# Patient Record
Sex: MALE | Race: WHITE | Employment: UNEMPLOYED | URBAN - METROPOLITAN AREA
[De-identification: names, ages, dates, MRNs, and addresses within clinical notes are randomized per-mention and may not be internally consistent; named-entity substitution may affect disease eponyms.]

---

## 2018-03-21 ENCOUNTER — TELEPHONE (OUTPATIENT)
Dept: FAMILY MEDICINE | Facility: CLINIC | Age: 53
End: 2018-03-21

## 2018-06-10 ENCOUNTER — NURSE TRIAGE (OUTPATIENT)
Dept: NURSING | Facility: CLINIC | Age: 53
End: 2018-06-10

## 2018-06-10 NOTE — TELEPHONE ENCOUNTER
"  Reason for Disposition    [1] SEVERE headache AND [2]  not improved 2 hours after pain medicine/ice packs    Additional Information    Negative: [1] ACUTE NEURO SYMPTOM AND [2] present now  (DEFINITION: difficult to awaken OR confused thinking and talking OR slurred speech OR weakness of arms OR unsteady walking)    Negative: Knocked out (unconscious) > 1 minute    Negative: Seizure (convulsion) occurred  (Exception: prior history of seizures and now alert and without Acute Neuro Symptoms)    Negative: Penetrating head injury (e.g., knife, gun shot wound, metal object)    Negative: [1] Major bleeding (e.g., actively dripping or spurting) AND [2] can't be stopped    Negative: [1] Dangerous mechanism of injury (e.g., MVA, diving, trampoline, contact sports, fall > 10 feet or 3 meters) AND [2] NECK pain AND [3] began < 1 hour after injury    Negative: Sounds like a life-threatening emergency to the triager    Negative: Can't remember what happened (amnesia)    Negative: Vomiting once or more    Negative: [1] Loss of vision or double vision AND [2] present now    Negative: Watery or blood-tinged fluid dripping from the NOSE or EARS now  (Exception: tears from crying or nosebleed from nasal trauma)    Negative: One or two \"black eyes\" (bruising, purple color of eyelids)    Negative: [1] Large swelling AND [2] size > palm of person's hand    Negative: Skin is split open or gaping  (or length > 1/2 inch or 12 mm)    Negative: [1] Bleeding AND [2] won't stop after 10 minutes of direct pressure (using correct technique)    Negative: Sounds like a serious injury to the triager    Negative: [1] ACUTE NEURO SYMPTOM AND [2] now fine  (DEFINITION: difficult to awaken OR confused thinking and talking OR slurred speech OR weakness of arms OR unsteady walking)    Negative: [1] Knocked out (unconscious) < 1 minute AND [2] now fine    Protocols used: HEAD INJURY-ADULT-    "

## 2018-06-10 NOTE — TELEPHONE ENCOUNTER
Kamaljit had slipped and hit the back of his head on the car.  Today his back and neck are hurting and he is having pain that is also going from back to chest area.

## 2018-06-21 ENCOUNTER — HOSPITAL ENCOUNTER (EMERGENCY)
Facility: CLINIC | Age: 53
Discharge: HOME | End: 2018-06-21
Attending: EMERGENCY MEDICINE | Admitting: EMERGENCY MEDICINE
Payer: COMMERCIAL

## 2018-06-21 VITALS
SYSTOLIC BLOOD PRESSURE: 180 MMHG | TEMPERATURE: 98 F | OXYGEN SATURATION: 99 % | RESPIRATION RATE: 16 BRPM | HEART RATE: 90 BPM | HEIGHT: 75 IN | BODY MASS INDEX: 31.08 KG/M2 | DIASTOLIC BLOOD PRESSURE: 100 MMHG | WEIGHT: 250 LBS

## 2018-06-21 DIAGNOSIS — M54.2 CERVICALGIA: ICD-10-CM

## 2018-06-21 LAB
ANION GAP SERPL CALCULATED.3IONS-SCNC: 9 MMOL/L (ref 3–14)
BASOPHILS # BLD AUTO: 0.1 10E9/L (ref 0–0.2)
BASOPHILS NFR BLD AUTO: 0.7 %
BUN SERPL-MCNC: 16 MG/DL (ref 7–30)
CALCIUM SERPL-MCNC: 8.6 MG/DL (ref 8.5–10.1)
CHLORIDE SERPL-SCNC: 105 MMOL/L (ref 94–109)
CO2 SERPL-SCNC: 26 MMOL/L (ref 20–32)
CREAT SERPL-MCNC: 0.8 MG/DL (ref 0.66–1.25)
DIFFERENTIAL METHOD BLD: NORMAL
EOSINOPHIL NFR BLD AUTO: 0.9 %
ERYTHROCYTE [DISTWIDTH] IN BLOOD BY AUTOMATED COUNT: 12.7 % (ref 10–15)
GFR SERPL CREATININE-BSD FRML MDRD: >90 ML/MIN/1.7M2
GLUCOSE SERPL-MCNC: 189 MG/DL (ref 70–99)
HCT VFR BLD AUTO: 42.9 % (ref 40–53)
HGB BLD-MCNC: 15.5 G/DL (ref 13.3–17.7)
IMM GRANULOCYTES # BLD: 0 10E9/L (ref 0–0.4)
IMM GRANULOCYTES NFR BLD: 0.4 %
LYMPHOCYTES # BLD AUTO: 1.1 10E9/L (ref 0.8–5.3)
LYMPHOCYTES NFR BLD AUTO: 14.9 %
MCH RBC QN AUTO: 31.9 PG (ref 26.5–33)
MCHC RBC AUTO-ENTMCNC: 36.1 G/DL (ref 31.5–36.5)
MCV RBC AUTO: 88 FL (ref 78–100)
MONOCYTES # BLD AUTO: 0.4 10E9/L (ref 0–1.3)
MONOCYTES NFR BLD AUTO: 5.8 %
NEUTROPHILS # BLD AUTO: 5.8 10E9/L (ref 1.6–8.3)
NEUTROPHILS NFR BLD AUTO: 77.3 %
NRBC # BLD AUTO: 0 10*3/UL
NRBC BLD AUTO-RTO: 0 /100
PLATELET # BLD AUTO: 244 10E9/L (ref 150–450)
POTASSIUM SERPL-SCNC: 3.3 MMOL/L (ref 3.4–5.3)
RBC # BLD AUTO: 4.86 10E12/L (ref 4.4–5.9)
SODIUM SERPL-SCNC: 140 MMOL/L (ref 133–144)
T4 FREE SERPL-MCNC: 0.81 NG/DL (ref 0.76–1.46)
TROPONIN I SERPL-MCNC: <0.015 UG/L (ref 0–0.04)
TSH SERPL DL<=0.005 MIU/L-ACNC: 14.81 MU/L (ref 0.4–4)
WBC # BLD AUTO: 7.5 10E9/L (ref 4–11)

## 2018-06-21 PROCEDURE — 84439 ASSAY OF FREE THYROXINE: CPT | Performed by: EMERGENCY MEDICINE

## 2018-06-21 PROCEDURE — 93005 ELECTROCARDIOGRAM TRACING: CPT | Performed by: EMERGENCY MEDICINE

## 2018-06-21 PROCEDURE — 84484 ASSAY OF TROPONIN QUANT: CPT | Performed by: EMERGENCY MEDICINE

## 2018-06-21 PROCEDURE — 99284 EMERGENCY DEPT VISIT MOD MDM: CPT | Mod: 25 | Performed by: EMERGENCY MEDICINE

## 2018-06-21 PROCEDURE — 80048 BASIC METABOLIC PNL TOTAL CA: CPT | Performed by: EMERGENCY MEDICINE

## 2018-06-21 PROCEDURE — 93010 ELECTROCARDIOGRAM REPORT: CPT | Mod: Z6 | Performed by: EMERGENCY MEDICINE

## 2018-06-21 PROCEDURE — 99284 EMERGENCY DEPT VISIT MOD MDM: CPT | Performed by: EMERGENCY MEDICINE

## 2018-06-21 PROCEDURE — 84443 ASSAY THYROID STIM HORMONE: CPT | Performed by: EMERGENCY MEDICINE

## 2018-06-21 PROCEDURE — 85025 COMPLETE CBC W/AUTO DIFF WBC: CPT | Performed by: EMERGENCY MEDICINE

## 2018-06-21 RX ORDER — HYDROCODONE BITARTRATE AND ACETAMINOPHEN 5; 325 MG/1; MG/1
1 TABLET ORAL EVERY 4 HOURS PRN
Qty: 20 TABLET | Refills: 0 | Status: SHIPPED | OUTPATIENT
Start: 2018-06-21 | End: 2018-07-09

## 2018-06-21 NOTE — ED TRIAGE NOTES
Pain in neck radiates into shoulder blades and left arm and chest since he fell 2 weeks ago. Slipped on wet surface. Pain is getting worse. Has been taking motrin and meloxicam for pain. Has also had swelling back of neck and left shoulder

## 2018-06-21 NOTE — ED AVS SNAPSHOT
[facility]    [address] [place] [address]    Phone:  [phone]    Fax:  [phone]                                       Kamaljit Montes De Oca   MRN: [id number]    Department:  [facility] Emergency Department   Date of Visit:  6/21/2018           Patient Information     Date Of Birth          1965        Your diagnoses for this visit were:     Cervicalgia        You were seen by Zeke Tubbs MD.      Follow-up Information     Follow up with [facility], [facility].    Why:  ER follow up, As planned    Contact information:    [address] [place] [address]  [phone]          Discharge Instructions         General Neck and Back Pain    Both neck and back pain are usually caused by injury to the muscles or ligaments of the spine. Sometimes the disks that separate each bone of the spine may cause pain by pressing on a nearby nerve. Back and neck pain may appear after a sudden twisting or bending force (such as in a car accident), or sometimes after a simple awkward movement. In either case, muscle spasm is often present and adds to the pain.  Acute neck and back pain usually gets better in 1 to 2 weeks. Pain related to disk disease, arthritis in the spinal joints or spinal stenosis (narrowing of the spinal canal) can become chronic and last for months or years.  Back and neck pain are common problems. Most people feel better in 1 or 2 weeks, and most of the rest in 1 to 2 months. Most people can remain active.  People have and describe pain differently.    Pain can be sharp, stabbing, shooting, aching, cramping, or burning    Movement, standing, bending, lifting, sitting, or walking may worsen the pain    Pain can be localized to one spot or area, or it can be more generalized    Pain can spread or radiate upwards, downwards, to the front, or go down your arms    Muscle spasm may occur.  Most of the time mechanical problems with the  muscles or spine cause the pain. it is usually caused by an injury, whether known or not, to the muscles or ligaments. While illnesses can cause back pain, it is usually not caused by a serious illness. Pain is usually related to physical activity, whether sports, exercise, work, or normal activity. Sometimes it can occur without an identifiable cause. This can happen simply by stretching or moving wrong, without noting pain at the time. Other causes include:    Overexertion, lifting, pushing, pulling incorrectly or too aggressively.    Sudden twisting, bending or stretching from an accident (car or fall), or accidental movement.    Poor posture    Poor conditioning, lack of regular exercise    Spinal disc disease or arthritis    Stress    Pregnancy, or illness like appendicitis, bladder or kidney infection, pelvic infections   Home care    For neck pain: Use a comfortable pillow that supports the head and keeps the spine in a neutral position. The position of the head should not be tilted forward or backward.    When in bed, try to find a position of comfort. A firm mattress is best. Try lying flat on your back with pillows under your knees. You can also try lying on your side with your knees bent up towards your chest and a pillow between your knees.    At first, do not try to stretch out the sore spots. If there is a strain, it is not like the good soreness you get after exercising without an injury. In this case, stretching may make it worse.    Don't sit for long periods, as in long car rides or other travel. This puts more stress on the lower back than standing or walking.    During the first 24 to 72 hours after an injury, apply an ice pack to the painful area for 20 minutes and then remove it for 20 minutes over a period of 60 to 90 minutes or several times a day.     You can alternate ice and heat therapies. Talk with your healthcare provider about the best treatment for your back or neck pain. As a safety  precaution, do not use a heating pad at bedtime. Sleeping with a heating pad can lead to skin burns or tissue damage.    Therapeutic massage can help relax the back and neck muscles without stretching them.    Be aware of safe lifting methods and do not lift anything over 15 pounds until all the pain is gone.  Medicines  Talk to your healthcare provider before using medicine, especially if you have other medical problems or are taking other medicines.    You may use over-the-counter medicine to control pain, unless another pain medicine was prescribed. If you have chronic conditions like diabetes, liver or kidney disease, stomach ulcers,  gastrointestinal bleeding, or are taking blood thinner medicines.    Be careful if you are given pain medicines, narcotics, or medicine for muscle spasm. They can cause drowsiness, and can affect your coordination, reflexes, and judgment. Do not drive or operate heavy machinery.  Follow-up care  Follow up with your healthcare provider, or as advised. Physical therapy or further tests may be needed.  If X-rays were taken, you will be notified of any new findings that may affect your care.  Call 911  Call 911 if any of the following occur:    Trouble breathing    Confusion    Very drowsy or trouble awakening    Fainting or loss of consciousness    Rapid or very slow heart rate    Loss of bowel or bladder control  When to seek medical advice  Call your healthcare provider right away if any of these occur:    Pain becomes worse or spreads into your arms or legs    Weakness, numbness or pain in one or both arms or legs    Numbness in the groin area    Difficulty walking    Fever of 100.4 F (38 C) or higher, or as directed by your healthcare provider  Date Last Reviewed: 7/1/2016 2000-2017 [employer]. [address] [place] [address]. All rights reserved. This information is not intended as a substitute for professional medical care. Always follow your  healthcare professional's instructions.          Your next 10 appointments already scheduled     Jun 27, 2018 10:40 AM CDT   Office Visit with Sixto Ellison MD   [facility] ([facility])    [address] [place] [address] [phone]           Bring a current list of meds and any records pertaining to this visit. For Physicals, please bring immunization records and any forms needing to be filled out. Please arrive 10 minutes early to complete paperwork.              24 Hour Appointment Hotline       To make an appointment at any [facility], call [phone] ([phone]). If you don't have a family doctor or clinic, we will help you find one. [facility] are conveniently located to serve the needs of you and your family.             Review of your medicines      START taking        Dose / Directions Last dose taken    HYDROcodone-acetaminophen 5-325 MG per tablet   Commonly known as:  NORCO   Dose:  1 tablet   Quantity:  20 tablet        Take 1 tablet by mouth every 4 hours as needed for pain   Refills:  0          Our records show that you are taking the medicines listed below. If these are incorrect, please call your family doctor or clinic.        Dose / Directions Last dose taken    blood glucose monitoring lancets   Quantity:  100 each        Use to test blood sugars twice daily as directed.   Refills:  prn        blood glucose monitoring test strip   Commonly known as:  CORRINA CONTOUR NEXT   Quantity:  1 Box        by In Vitro route daily.   Refills:  12        enalapril 1 MG/ML solution   Commonly known as:  EPANED   Dose:  5 mg   Quantity:  150 mL        Take 5 mg by mouth daily   Refills:  1        fluconazole 100 MG tablet   Commonly known as:  DIFLUCAN   Quantity:  11 tablet        Take 2 tablets first day, then once daily.   Refills:  0        ibuprofen 100 MG/5ML suspension   Commonly known as:  ADVIL/MOTRIN   Quantity:  500 mL        May  take 10 to 20 ml every 6-8 hours prn pain   Refills:  6        mineral oil-hydrophilic petrolatum   Quantity:  420 g        Apply  topically as needed for dry skin.   Refills:  6        nicotine 21 MG/24HR 24 hr patch   Commonly known as:  NICODERM CQ   Dose:  1 patch   Quantity:  30 patch        Place 1 patch onto the skin every 24 hours.   Refills:  0        ondansetron 4 MG ODT tab   Commonly known as:  ZOFRAN ODT   Dose:  4 mg   Quantity:  20 tablet        Take 1 tablet by mouth every 8 hours as needed for nausea.   Refills:  1        ranitidine 150 MG tablet   Commonly known as:  ZANTAC   Dose:  150 mg   Quantity:  60 tablet        Take 1 tablet (150 mg) by mouth 2 times daily   Refills:  1                Information about OPIOIDS     PRESCRIPTION OPIOIDS: WHAT YOU NEED TO KNOW   We gave you an opioid (narcotic) pain medicine. It is important to manage your pain, but opioids are not always the best choice. You should first try all the other options your care team gave you. Take this medicine for as short a time (and as few doses) as possible.     These medicines have risks:    DO NOT drive when on new or higher doses of pain medicine. These medicines can affect your alertness and reaction times, and you could be arrested for driving under the influence (DUI). If you need to use opioids long-term, talk to your care team about driving.    DO NOT operate heave machinery    DO NOT do any other dangerous activities while taking these medicines.     DO NOT drink any alcohol while taking these medicines.      If the opioid prescribed includes acetaminophen, DO NOT take with any other medicines that contain acetaminophen. Read all labels carefully. Look for the word  acetaminophen  or  Tylenol.  Ask your pharmacist if you have questions or are unsure.    You can get addicted to pain medicines, especially if you have a history of addiction (chemical, alcohol or substance dependence). Talk to your care team about ways to  reduce this risk.    Store your pills in a secure place, locked if possible. We will not replace any lost or stolen medicine. If you don t finish your medicine, please throw away (dispose) as directed by your pharmacist. The [place] [employer] has more information about safe disposal: https://www.pca.[facility].[place]/living-green/managing-unwanted-medications.     All opioids tend to cause constipation. Drink plenty of water and eat foods that have a lot of fiber, such as fruits, vegetables, prune juice, apple juice and high-fiber cereal. Take a laxative (Miralax, milk of magnesia, Colace, Senna) if you don t move your bowels at least every other day.         Prescriptions were sent or printed at these locations (1 Prescription)                   [facility] [place], [place] - [address]    [address] , [address] [facility] [address]    Telephone:  [phone]   Fax:  [phone]   Hours:                  Printed at Department/Unit printer (1 of 1)         HYDROcodone-acetaminophen (NORCO) 5-325 MG per tablet                Procedures and tests performed during your visit     Basic metabolic panel    CBC with platelets differential    EKG 12 lead    Peripheral IV catheter    TSH with free T4 reflex    Troponin I (now)      Orders Needing Specimen Collection     None      Pending Results     Date and Time Order Name Status Description    6/21/2018 1534 TSH with free T4 reflex In process             Pending Culture Results     No orders found from 6/19/2018 to 6/22/2018.            Pending Results Instructions     If you had any lab results that were not finalized at the time of your Discharge, you can call the ED Lab Result RN at [phone]. You will be contacted by this team for any positive Lab results or changes in treatment. The nurses are available 7 days a week from 10A to 6:30P.  You can leave a message 24 hours per day and they will return your call.        Thank you for choosing  "[facility]       Thank you for choosing [facility] for your care. Our goal is always to provide you with excellent care. Hearing back from our patients is one way we can continue to improve our services. Please take a few minutes to complete the written survey that you may receive in the mail after you visit with us. Thank you!        [employer] Information     [employer] lets you send messages to your doctor, view your test results, renew your prescriptions, schedule appointments and more. To sign up, go to [email or web address] . Click on \"Log in\" on the left side of the screen, which will take you to the Welcome page. Then click on \"Sign up Now\" on the right side of the page.     You will be asked to enter the access code listed below, as well as some personal information. Please follow the directions to create your username and password.     Your access code is: [email or web address]  Expires: 2018  4:18 PM     Your access code will  in 90 days. If you need help or a new code, please call your [facility] clinic or [phone].        Care EveryWhere ID     This is your Care EveryWhere ID. This could be used by other organizations to access your [facility] medical records  [id number]        Equal Access to Services     SUZE RILEY : Whitley Torres, waadida philippe, qaybta kaalmada adeangi, benjie johnston. So [facility] [phone].    ATENCIÓN: Si habla español, tiene a parson disposición servicios gratuitos de asistencia lingüística. Llame al [phone].    We comply with applicable federal civil rights laws and [place] laws. We do not discriminate on the basis of race, color, national origin, age, disability, sex, sexual orientation, or gender identity.            After Visit Summary       This is your record. Keep this with you and show to your community pharmacist(s) and doctor(s) at your next visit.                  "

## 2018-06-21 NOTE — LETTER
June 21, 2018      To Whom It May Concern:      Kamaljit Montes De Oca was seen in our Emergency Department today, 06/21/18.  I expect his condition to improve over the next 3 days.  He may return to work/school when improved.    Sincerely,        Zeke Tubbs MD

## 2018-06-21 NOTE — DISCHARGE INSTRUCTIONS
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
## 2018-06-21 NOTE — ED AVS SNAPSHOT
[facility] Emergency Department    [address] [place] [address]    Phone:  [phone]    Fax:  [phone]                                       Kamaljit Montes De Oca   MRN: [id number]    Department:  [facility] Emergency Department   Date of Visit:  6/21/2018           After Visit Summary Signature Page     I have received my discharge instructions, and my questions have been answered. I have discussed any challenges I see with this plan with the nurse or doctor.    ..........................................................................................................................................  Patient/Patient Representative Signature      ..........................................................................................................................................  Patient Representative Print Name and Relationship to Patient    ..................................................               ................................................  Date                                            Time    ..........................................................................................................................................  Reviewed by Signature/Title    ...................................................              ..............................................  Date                                                            Time

## 2018-07-09 ENCOUNTER — OFFICE VISIT (OUTPATIENT)
Dept: FAMILY MEDICINE | Facility: CLINIC | Age: 53
End: 2018-07-09
Payer: COMMERCIAL

## 2018-07-09 VITALS
OXYGEN SATURATION: 98 % | RESPIRATION RATE: 20 BRPM | TEMPERATURE: 98.2 F | WEIGHT: 267.6 LBS | DIASTOLIC BLOOD PRESSURE: 78 MMHG | HEART RATE: 90 BPM | BODY MASS INDEX: 33.45 KG/M2 | SYSTOLIC BLOOD PRESSURE: 138 MMHG

## 2018-07-09 DIAGNOSIS — E03.9 HYPOTHYROIDISM, UNSPECIFIED TYPE: ICD-10-CM

## 2018-07-09 DIAGNOSIS — F41.9 ANXIETY: ICD-10-CM

## 2018-07-09 DIAGNOSIS — M54.2 CERVICALGIA: Primary | ICD-10-CM

## 2018-07-09 DIAGNOSIS — R73.09 ELEVATED GLUCOSE: ICD-10-CM

## 2018-07-09 DIAGNOSIS — J38.7 SUPRAGLOTTIC MASS: ICD-10-CM

## 2018-07-09 LAB — HBA1C MFR BLD: 6.7 % (ref 0–5.6)

## 2018-07-09 PROCEDURE — 83036 HEMOGLOBIN GLYCOSYLATED A1C: CPT | Performed by: FAMILY MEDICINE

## 2018-07-09 PROCEDURE — 99204 OFFICE O/P NEW MOD 45 MIN: CPT | Performed by: FAMILY MEDICINE

## 2018-07-09 PROCEDURE — 36415 COLL VENOUS BLD VENIPUNCTURE: CPT | Performed by: FAMILY MEDICINE

## 2018-07-09 RX ORDER — HYDROCODONE BITARTRATE AND ACETAMINOPHEN 5; 325 MG/1; MG/1
1 TABLET ORAL EVERY 4 HOURS PRN
Qty: 30 TABLET | Refills: 0 | Status: SHIPPED | OUTPATIENT
Start: 2018-07-09 | End: 2018-07-24

## 2018-07-09 RX ORDER — LEVOTHYROXINE SODIUM 75 UG/1
75 TABLET ORAL DAILY
Qty: 90 TABLET | Refills: 1 | Status: SHIPPED | OUTPATIENT
Start: 2018-07-09 | End: 2019-06-24

## 2018-07-09 ASSESSMENT — PAIN SCALES - GENERAL: PAINLEVEL: EXTREME PAIN (8)
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
## 2018-07-09 NOTE — LETTER
[address] [address]  Phone: [phone]  Fax: [phone]    July 9, 2018        Kamaljit Montes De Oca  [id number] [address] [place] [id number]          To whom it may concern:    RE: Kamaljit Montes De Oca    Patient was seen and treated today at our clinic.  Please excuse him from work through July 13 for medical reasons.    Please contact me for questions or concerns.      Sincerely,        Sixto Ellison MD

## 2018-07-09 NOTE — MR AVS SNAPSHOT
"              After Visit Summary   7/9/2018    Kamaljit Montes De Oca    MRN: [id number]           Patient Information     Date Of Birth          1965        Visit Information        Provider Department      7/9/2018 8:40 AM Sixto Ellison MD [facility]        Today's Diagnoses     Cervicalgia    -  1    Anxiety        Hypothyroidism, unspecified type        Elevated glucose        Supraglottic mass           Follow-ups after your visit        Additional Services     PHYSICAL THERAPY REFERRAL       *This therapy referral will be filtered to a centralized scheduling office at [facility] and the patient will receive a call to schedule an appointment at a [place] location most convenient for them. *     [facility] provides Physical Therapy evaluation and treatment and many specialty services across the [place] system.  If requesting a specialty program, please choose from the list below.    If you have not heard from the scheduling office within 2 business days, please call [phone] for all locations, with the exception of [place], please call [phone] and [facility], please call [phone]  Treatment: Evaluation & Treatment  Special Instructions/Modalities:   Special Programs:     Please be aware that coverage of these services is subject to the terms and limitations of your health insurance plan.  Call member services at your health plan with any benefit or coverage questions.      **Note to Provider:  If you are referring outside of [place] for the therapy appointment, please list the name of the location in the \"special instructions\" above, print the referral and give to the patient to schedule the appointment.                  Who to contact     If you have questions or need follow up information about today's clinic visit or your schedule please contact [facility] directly at [phone].  Normal or non-critical lab " "and imaging results will be communicated to you by MyChart, letter or phone within 4 business days after the clinic has received the results. If you do not hear from us within 7 days, please contact the clinic through Postlingt or phone. If you have a critical or abnormal lab result, we will notify you by phone as soon as possible.  Submit refill requests through Fixya or call your pharmacy and they will forward the refill request to us. Please allow 3 business days for your refill to be completed.          Additional Information About Your Visit        VenmoharLolabox Information     Fixya lets you send messages to your doctor, view your test results, renew your prescriptions, schedule appointments and more. To sign up, go to [email or web address] . Click on \"Log in\" on the left side of the screen, which will take you to the Welcome page. Then click on \"Sign up Now\" on the right side of the page.     You will be asked to enter the access code listed below, as well as some personal information. Please follow the directions to create your username and password.     Your access code is: [email or web address]  Expires: 2018  4:18 PM     Your access code will  in 90 days. If you need help or a new code, please call your [place] clinic or [phone].        Care EveryWhere ID     This is your Care EveryWhere ID. This could be used by other organizations to access your [place] medical records  [id number]        Your Vitals Were     Pulse Temperature Respirations Pulse Oximetry BMI (Body Mass Index)       90 98.2  F (36.8  C) (Temporal) 20 98% 33.45 kg/m2        Blood Pressure from Last 3 Encounters:   18 138/78   18 (!) 180/100   14 133/77    Weight from Last 3 Encounters:   18 267 lb 9.6 oz (121.4 kg)   18 250 lb (113.4 kg)   14 243 lb 9.7 oz (110.5 kg)              We Performed the Following     Hemoglobin A1c     PHYSICAL THERAPY REFERRAL          Today's Medication Changes       "    These changes are accurate as of 7/9/18  9:42 AM.  If you have any questions, ask your nurse or doctor.               Start taking these medicines.        Dose/Directions    levothyroxine 75 MCG tablet   Commonly known as:  SYNTHROID/LEVOTHROID   Used for:  Hypothyroidism, unspecified type   Started by:  Sixto Ellison MD        Dose:  75 mcg   Take 1 tablet (75 mcg) by mouth daily   Quantity:  90 tablet   Refills:  1       sertraline 50 MG tablet   Commonly known as:  ZOLOFT   Used for:  Anxiety   Started by:  Sixto Ellison MD        Dose:  50 mg   Take 1 tablet (50 mg) by mouth daily   Quantity:  30 tablet   Refills:  3            Where to get your medicines      These medications were sent to [facility] [place] - [address]   [address] [facility] [address]     Phone:  [phone]     levothyroxine 75 MCG tablet    sertraline 50 MG tablet         Some of these will need a paper prescription and others can be bought over the counter.  Ask your nurse if you have questions.     Bring a paper prescription for each of these medications     HYDROcodone-acetaminophen 5-325 MG per tablet               Information about OPIOIDS     PRESCRIPTION OPIOIDS: WHAT YOU NEED TO KNOW   We gave you an opioid (narcotic) pain medicine. It is important to manage your pain, but opioids are not always the best choice. You should first try all the other options your care team gave you. Take this medicine for as short a time (and as few doses) as possible.     These medicines have risks:    DO NOT drive when on new or higher doses of pain medicine. These medicines can affect your alertness and reaction times, and you could be arrested for driving under the influence (DUI). If you need to use opioids long-term, talk to your care team about driving.    DO NOT operate heave machinery    DO NOT do any other dangerous activities while taking these medicines.     DO NOT drink any alcohol while  taking these medicines.      If the opioid prescribed includes acetaminophen, DO NOT take with any other medicines that contain acetaminophen. Read all labels carefully. Look for the word  acetaminophen  or  Tylenol.  Ask your pharmacist if you have questions or are unsure.    You can get addicted to pain medicines, especially if you have a history of addiction (chemical, alcohol or substance dependence). Talk to your care team about ways to reduce this risk.    Store your pills in a secure place, locked if possible. We will not replace any lost or stolen medicine. If you don t finish your medicine, please throw away (dispose) as directed by your pharmacist. The [place] Pollution Control Agency has more information about safe disposal: https://www.pca.[facility].mn.us/living-green/managing-unwanted-medications.     All opioids tend to cause constipation. Drink plenty of water and eat foods that have a lot of fiber, such as fruits, vegetables, prune juice, apple juice and high-fiber cereal. Take a laxative (Miralax, milk of magnesia, Colace, Senna) if you don t move your bowels at least every other day.          Primary Care Provider Office Phone # Fax #    [facility] [phone]       [address] [facility] [address]        Equal Access to Services     SUZE RILEY : Whitley kuoo [place], waaxda luqadaha, qaybta kaalmada adeegyada, benjie johnston. So [facility] [phone].    ATENCIÓN: Si habla español, tiene a parson disposición servicios gratuitos de asistencia lingüística. Llame al [phone].    We comply with applicable federal civil rights laws and [place] laws. We do not discriminate on the basis of race, color, national origin, age, disability, sex, sexual orientation, or gender identity.            Thank you!     Thank you for choosing [facility]  for your care. Our goal is always to provide you with excellent care. Hearing  back from our patients is one way we can continue to improve our services. Please take a few minutes to complete the written survey that you may receive in the mail after your visit with us. Thank you!             Your Updated Medication List - Protect others around you: Learn how to safely use, store and throw away your medicines at www.disposemymeds.org.          This list is accurate as of 7/9/18  9:42 AM.  Always use your most recent med list.                   Brand Name Dispense Instructions for use Diagnosis    blood glucose monitoring lancets     100 each    Use to test blood sugars twice daily as directed.    Abnormal blood sugar       blood glucose monitoring test strip    CORRINA CONTOUR NEXT    1 Box    by In Vitro route daily.    Abnormal blood sugar       enalapril 1 MG/ML solution    EPANED    150 mL    Take 5 mg by mouth daily    Hypertension goal BP (blood pressure) < 140/90       HYDROcodone-acetaminophen 5-325 MG per tablet    NORCO    30 tablet    Take 1 tablet by mouth every 4 hours as needed for pain    Cervicalgia       ibuprofen 100 MG/5ML suspension    ADVIL/MOTRIN    500 mL    May take 10 to 20 ml every 6-8 hours prn pain    Malignant neoplasm of supraglottis (H)       levothyroxine 75 MCG tablet    SYNTHROID/LEVOTHROID    90 tablet    Take 1 tablet (75 mcg) by mouth daily    Hypothyroidism, unspecified type       mineral oil-hydrophilic petrolatum     420 g    Apply  topically as needed for dry skin.    Other diseases of larynx       ondansetron 4 MG ODT tab    ZOFRAN ODT    20 tablet    Take 1 tablet by mouth every 8 hours as needed for nausea.    Nausea, Malignant neoplasm of supraglottis (H)       ranitidine 150 MG tablet    ZANTAC    60 tablet    Take 1 tablet (150 mg) by mouth 2 times daily    Pancreatitis, GERD (gastroesophageal reflux disease)       sertraline 50 MG tablet    ZOLOFT    30 tablet    Take 1 tablet (50 mg) by mouth daily    Anxiety

## 2018-07-12 ENCOUNTER — TELEPHONE (OUTPATIENT)
Dept: FAMILY MEDICINE | Facility: CLINIC | Age: 53
End: 2018-07-12

## 2018-07-12 NOTE — TELEPHONE ENCOUNTER
----- Message from Sixto Ellison MD sent at 7/12/2018  1:42 PM CDT -----  Test for diabetes comes back positive for this.  Need to set up an appointment to discuss this and get going on medication and further workup of the diabetes.

## 2018-07-12 NOTE — TELEPHONE ENCOUNTER
LM on numerical id vm. Please inform patient of results and schedule appointment to f/u with Dr Ellison. . Chris/MA

## 2018-07-12 NOTE — LETTER
[address] [id number] [phone]        July 16, 2018    Kamaljit Montes De Oca  [address] [address] [place] [address]          Dear Kamaljit,    We have attempted to reach you by phone and were unsuccessful. Your diabetes test came back positive. So we need to set up appointment to discuss this and get you going on medication and further work up of the diabetes. Please call us as soon as possible at [phone]      Sincerely,        Sixto Ellison M.D. Care Team

## 2018-07-13 NOTE — TELEPHONE ENCOUNTER
Left message for patient to return call to clinic.  Please inform patient of results and schedule appointment to f/u with Dr. Ellison.    Wendi Patterson, MAGALI  July 13, 2018

## 2018-07-16 NOTE — TELEPHONE ENCOUNTER
Unable to reach patient after multiple attempts sending letter to patient to call and schedule appt.  Lauryn Delgado MA

## 2018-07-18 ENCOUNTER — TELEPHONE (OUTPATIENT)
Dept: FAMILY MEDICINE | Facility: CLINIC | Age: 53
End: 2018-07-18

## 2018-07-18 NOTE — LETTER
July 19, 2018      Kamaljit Montes De Oca  [address] [address] [place] [address]        Dear ,    We are writing to inform you of your test results.      Test for diabetes comes back positive for this.  Need to set up an appointment to discuss this and get going on medication and further workup of the diabetes.     We tried to reach you by phone, but we were unable to leave message.     If you have any questions or concerns, please call the clinic at the number listed above.       Sincerely,        Sixto Ellison MD

## 2018-07-18 NOTE — TELEPHONE ENCOUNTER
Reason for Call:  Request for results:    Name of test or procedure: labs     Date of test of procedure: 7/9    Location of the test or procedure: [facility] to leave the result message on voice mail or with a family member? YES    Phone number Patient can be reached at:  Cell number on file:    Telephone Information:   Mobile [phone]       Additional comments: any      Call taken on 7/18/2018 at 11:50 AM by Eva Kerr

## 2018-07-18 NOTE — TELEPHONE ENCOUNTER
Per Dr. Ellison:  Notes Recorded by Sixto Ellison MD on 7/12/2018 at 1:42 PM  Test for diabetes comes back positive for this.  Need to set up an appointment to discuss this and get going on medication and further workup of the diabetes.     Lost call. Please relay results to pt when calls back and assist in scheduling appt with Dr. Ellison.

## 2018-07-23 ENCOUNTER — TELEPHONE (OUTPATIENT)
Dept: FAMILY MEDICINE | Facility: CLINIC | Age: 53
End: 2018-07-23
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
## 2018-07-24 DIAGNOSIS — M54.2 CERVICALGIA: ICD-10-CM

## 2018-07-24 NOTE — TELEPHONE ENCOUNTER
Norco 5-325 MG       Last Written Prescription Date:  7/9/18  Last Fill Quantity: 30,   # refills: 0  Last Office Visit: 7/9/18  Future Office visit:       Routing refill request to provider for review/approval because:  Drug not on the [facility], [facility] or [facility] refill protocol or controlled substance

## 2018-07-24 NOTE — TELEPHONE ENCOUNTER
Patients wife calling to follow up on message, please advise when back in clinic on Wed.  Thank you,  Adrienne Rachel  Patient Representative

## 2018-07-25 RX ORDER — HYDROCODONE BITARTRATE AND ACETAMINOPHEN 5; 325 MG/1; MG/1
1 TABLET ORAL EVERY 4 HOURS PRN
Qty: 30 TABLET | Refills: 0 | Status: ON HOLD | OUTPATIENT
Start: 2018-07-25 | End: 2020-04-10

## 2018-07-26 ENCOUNTER — HOSPITAL ENCOUNTER (OUTPATIENT)
Dept: PHYSICAL THERAPY | Facility: OTHER | Age: 53
Setting detail: THERAPIES SERIES
End: 2018-07-26
Attending: FAMILY MEDICINE
Payer: COMMERCIAL

## 2018-07-26 ENCOUNTER — TELEPHONE (OUTPATIENT)
Dept: FAMILY MEDICINE | Facility: CLINIC | Age: 53
End: 2018-07-26

## 2018-07-26 PROCEDURE — 97110 THERAPEUTIC EXERCISES: CPT | Mod: GP | Performed by: PHYSICAL THERAPIST

## 2018-07-26 PROCEDURE — 97161 PT EVAL LOW COMPLEX 20 MIN: CPT | Mod: GP | Performed by: PHYSICAL THERAPIST

## 2018-07-26 PROCEDURE — 40000718 ZZHC STATISTIC PT DEPARTMENT ORTHO VISIT: Performed by: PHYSICAL THERAPIST

## 2018-07-26 PROCEDURE — 97140 MANUAL THERAPY 1/> REGIONS: CPT | Mod: GP | Performed by: PHYSICAL THERAPIST
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
## 2018-07-26 NOTE — TELEPHONE ENCOUNTER
Reason for call:  Form   Our goal is to have forms completed within 72 hours, however some forms may require a visit or additional information.     Who is the form from? Patient  Where did the form come from? Patient or family brought in     What clinic location was the form placed at? [place]  Where was the form placed? 's Box  What number is listed as a contact on the form? [phone]    Phone call message - patient request for a letter, form or note:     Date needed: as soon as possible  Please fax to [facility] [phone]  Please mail original to home address [id number] [address] [place] [address]    Has the patient signed a consent form for release of information? YES    Additional comments: None    Type of letter, form or note: Medical    Phone number to reach patient:  Home number on file [phone] (home)    Best Time:  Any    Can we leave a detailed message on this number?  YES

## 2018-07-26 NOTE — PROGRESS NOTES
07/26/18 1400   Neck Disability Index (  Anshu CHERRY. and Damaris PAYTON. 1991. All rights reserved.; used with permission)   SECTION 1 - PAIN INTENSITY 1   SECTION 2 - PERSONAL CARE 1   SECTION 3 - LIFTING 1   SECTION 4 - READING 2   SECTION 5 - HEADACHES 1   SECTION 6 - CONCENTRATION 1   SECTION 7 - WORK 1   SECTION 8 - DRIVING 2   SECTION 9 - SLEEPING 3  (wakes every 1 1/2 hours )   SECTION 10 - RECREATION 2   Count 10   Sum 15   Raw Score: /50 15   Neck Disability Index Score: (%) 30 %

## 2018-07-30 NOTE — TELEPHONE ENCOUNTER
The patient called back and information has been given.   Thank you,  Do Clark   for [facility]

## 2018-07-30 NOTE — TELEPHONE ENCOUNTER
Patient has appointment on 8/6/18 to see Dr. Ellison. Per Dr. Ellison patient needs to be seen to complete form.  Deepti Rudolph MA

## 2018-07-30 NOTE — TELEPHONE ENCOUNTER
Called patient to inform that forms will need to be completed at office visit 8/6/18. Was unable to leave message due to voicemail being full.  Deepti Rudolph MA

## 2018-08-02 ENCOUNTER — HOSPITAL ENCOUNTER (OUTPATIENT)
Dept: PHYSICAL THERAPY | Facility: OTHER | Age: 53
Setting detail: THERAPIES SERIES
End: 2018-08-02
Attending: FAMILY MEDICINE
Payer: COMMERCIAL

## 2018-08-02 PROCEDURE — 97140 MANUAL THERAPY 1/> REGIONS: CPT | Mod: GP | Performed by: PHYSICAL THERAPIST

## 2018-08-02 PROCEDURE — 97110 THERAPEUTIC EXERCISES: CPT | Mod: GP | Performed by: PHYSICAL THERAPIST

## 2018-08-02 PROCEDURE — 40000718 ZZHC STATISTIC PT DEPARTMENT ORTHO VISIT: Performed by: PHYSICAL THERAPIST

## 2018-08-29 NOTE — ADDENDUM NOTE
Encounter addended by: Deepti Nieves, PT on: 8/29/2018  8:09 AM<BR>     Actions taken: Episode resolved, Sign clinical note
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
## 2018-11-26 DIAGNOSIS — F41.9 ANXIETY: ICD-10-CM

## 2018-11-28 ENCOUNTER — TELEPHONE (OUTPATIENT)
Dept: FAMILY MEDICINE | Facility: CLINIC | Age: 53
End: 2018-11-28

## 2018-11-28 NOTE — TELEPHONE ENCOUNTER
Left message for patient to call back and speak with any .    Thank you,  Do Clark   for [facility]

## 2018-11-28 NOTE — TELEPHONE ENCOUNTER
"Requested Prescriptions   Pending Prescriptions Disp Refills     sertraline (ZOLOFT) 50 MG tablet [Pharmacy Med Name: SERTRALINE HCL 50MG TABS] 30 tablet 3    Last Written Prescription Date:  7/9/18  Last Fill Quantity: 30,  # refills: 3   Last office visit: 7/9/2018 with prescribing provider:  Scot   Future Office Visit:     Sig: TAKE ONE TABLET BY MOUTH EVERY DAY    SSRIs Protocol Passed    11/26/2018  4:09 PM       Passed - Recent (12 mo) or future (30 days) visit within the authorizing provider's specialty    Patient had office visit in the last 12 months or has a visit in the next 30 days with authorizing provider or within the authorizing provider's specialty.  See \"Patient Info\" tab in inbasket, or \"Choose Columns\" in Meds & Orders section of the refill encounter.             Passed - Patient is age 18 or older        PHQ-9 score:    PHQ-9 SCORE 12/5/2013   PHQ-9 Total Score 7       Routing refill request to provider for review/approval because:  Patient needs to be seen because:  Was to follow up in September, has \"no showed\" last 2 appointments.    T'd up 1 month for provider review.  Will forward to schedulers to schedule patient for OV.    Kyra Yang RN                "

## 2018-11-29 NOTE — TELEPHONE ENCOUNTER
Reason for Call:  Same Day Appointment, Requested Provider:  Sixto Ellison M.D.    PCP: Clinic, [facility]    Reason for visit: patient is experiencing really bad abdominal and neck pain for the past 3 weeks. Not getting better.     Duration of symptoms: 3 weeks    Have you been treated for this in the past? Yes    Additional comments: Patient wanted me to let you know that they live 30 mins from town and to please keep this in mind when scheduling appt.      Can we leave a detailed message on this number? YES    Phone number patient can be reached at: Other phone number:  [phone]    Best Time: any    Call taken on 11/28/2018 at 6:48 PM by Jennifer Mckinnon

## 2018-11-29 NOTE — TELEPHONE ENCOUNTER
Tried calling patient to schedule appointment unable to leave message due to no vm set up.  Deepti Rudolph MA

## 2019-06-24 DIAGNOSIS — E03.9 HYPOTHYROIDISM, UNSPECIFIED TYPE: ICD-10-CM

## 2019-06-26 NOTE — TELEPHONE ENCOUNTER
"Requested Prescriptions   Pending Prescriptions Disp Refills     levothyroxine (SYNTHROID/LEVOTHROID) 75 MCG tablet [Pharmacy Med Name: LEVOTHYROXINE SODIUM 75MCG TABS] 90 tablet 1     Sig: TAKE ONE TABLET BY MOUTH EVERY DAY   Last Written Prescription Date:  7/9/18  Last Fill Quantity: 90,  # refills: 1   Last office visit: 7/9/2018 with prescribing provider:     Future Office Visit:        Thyroid Protocol Failed - 6/24/2019  4:24 PM        Failed - Normal TSH on file in past 12 months     Recent Labs   Lab Test 06/21/18  1519   TSH 14.81*              Passed - Patient is 12 years or older        Passed - Recent (12 mo) or future (30 days) visit within the authorizing provider's specialty     Patient had office visit in the last 12 months or has a visit in the next 30 days with authorizing provider or within the authorizing provider's specialty.  See \"Patient Info\" tab in inbasket, or \"Choose Columns\" in Meds & Orders section of the refill encounter.              Passed - Medication is active on med list        Routing refill request to provider for review/approval because:  Labs out of range:  TSH  A break in medication  Patient needs to be seen because:  Has No Showed multiple appointments.  T'd up 1 month for provider review.    Will forward to schedulers to schedule patient for OV - please be sure to emphasize the importance of coming to an appointment for diabetes and hypothyroidism - meds cannot be refilled with out appointments..  Wendi Sanderson RN            "

## 2019-06-27 RX ORDER — LEVOTHYROXINE SODIUM 75 UG/1
TABLET ORAL
Qty: 90 TABLET | Refills: 1 | Status: ON HOLD | OUTPATIENT
Start: 2019-06-27 | End: 2020-04-10

## 2019-06-27 NOTE — TELEPHONE ENCOUNTER
Tried to reach pt- no answer & no voice mail. Will try again later.   Thank you,  Eva Kerr- Patient Representative

## 2020-04-05 ENCOUNTER — APPOINTMENT (OUTPATIENT)
Dept: CT IMAGING | Facility: CLINIC | Age: 55
End: 2020-04-05
Attending: EMERGENCY MEDICINE
Payer: COMMERCIAL

## 2020-04-05 ENCOUNTER — HOSPITAL ENCOUNTER (INPATIENT)
Facility: CLINIC | Age: 55
LOS: 6 days | Discharge: HOME | End: 2020-04-11
Attending: EMERGENCY MEDICINE | Admitting: HOSPITALIST
Payer: COMMERCIAL

## 2020-04-05 DIAGNOSIS — F41.1 GENERALIZED ANXIETY DISORDER: ICD-10-CM

## 2020-04-05 DIAGNOSIS — K21.9 GASTROESOPHAGEAL REFLUX DISEASE WITHOUT ESOPHAGITIS: ICD-10-CM

## 2020-04-05 DIAGNOSIS — I10 SEVERE HYPERTENSION: ICD-10-CM

## 2020-04-05 DIAGNOSIS — E11.65 TYPE 2 DIABETES MELLITUS WITH HYPERGLYCEMIA, WITHOUT LONG-TERM CURRENT USE OF INSULIN (H): ICD-10-CM

## 2020-04-05 DIAGNOSIS — E11.65 UNCONTROLLED TYPE 2 DIABETES MELLITUS WITH HYPERGLYCEMIA (H): ICD-10-CM

## 2020-04-05 DIAGNOSIS — F32.0 MILD MAJOR DEPRESSION (H): ICD-10-CM

## 2020-04-05 DIAGNOSIS — E03.9 HYPOTHYROIDISM, UNSPECIFIED TYPE: ICD-10-CM

## 2020-04-05 DIAGNOSIS — N30.80 EMPHYSEMATOUS CYSTITIS: ICD-10-CM

## 2020-04-05 DIAGNOSIS — K80.20 CALCULUS OF GALLBLADDER WITHOUT CHOLECYSTITIS WITHOUT OBSTRUCTION: ICD-10-CM

## 2020-04-05 DIAGNOSIS — K59.03 DRUG-INDUCED CONSTIPATION: Primary | ICD-10-CM

## 2020-04-05 LAB
ALBUMIN SERPL-MCNC: 2.2 G/DL (ref 3.4–5)
ALBUMIN UR-MCNC: 30 MG/DL
ALP SERPL-CCNC: 169 U/L (ref 40–150)
ALT SERPL W P-5'-P-CCNC: 10 U/L (ref 0–70)
ANION GAP SERPL CALCULATED.3IONS-SCNC: 10 MMOL/L (ref 3–14)
APPEARANCE UR: ABNORMAL
AST SERPL W P-5'-P-CCNC: 10 U/L (ref 0–45)
BACTERIA #/AREA URNS HPF: ABNORMAL /HPF
BASOPHILS # BLD AUTO: 0 10E9/L (ref 0–0.2)
BASOPHILS NFR BLD AUTO: 0 %
BILIRUB SERPL-MCNC: 0.9 MG/DL (ref 0.2–1.3)
BILIRUB UR QL STRIP: NEGATIVE
BUN SERPL-MCNC: 17 MG/DL (ref 7–30)
CALCIUM SERPL-MCNC: 8.6 MG/DL (ref 8.5–10.1)
CHLORIDE SERPL-SCNC: 101 MMOL/L (ref 94–109)
CO2 SERPL-SCNC: 28 MMOL/L (ref 20–32)
COLOR UR AUTO: YELLOW
CREAT SERPL-MCNC: 0.52 MG/DL (ref 0.66–1.25)
DIFFERENTIAL METHOD BLD: ABNORMAL
EOSINOPHIL # BLD AUTO: 0 10E9/L (ref 0–0.7)
EOSINOPHIL NFR BLD AUTO: 0 %
ERYTHROCYTE [DISTWIDTH] IN BLOOD BY AUTOMATED COUNT: 12.5 % (ref 10–15)
GFR SERPL CREATININE-BSD FRML MDRD: >90 ML/MIN/{1.73_M2}
GLUCOSE SERPL-MCNC: 276 MG/DL (ref 70–99)
GLUCOSE UR STRIP-MCNC: >499 MG/DL
HBA1C MFR BLD: 8.5 % (ref 0–5.6)
HCT VFR BLD AUTO: 38 % (ref 40–53)
HGB BLD-MCNC: 13.4 G/DL (ref 13.3–17.7)
HGB UR QL STRIP: ABNORMAL
KETONES UR STRIP-MCNC: 5 MG/DL
LEUKOCYTE ESTERASE UR QL STRIP: ABNORMAL
LYMPHOCYTES # BLD AUTO: 1.1 10E9/L (ref 0.8–5.3)
LYMPHOCYTES NFR BLD AUTO: 7 %
MCH RBC QN AUTO: 30.4 PG (ref 26.5–33)
MCHC RBC AUTO-ENTMCNC: 35.3 G/DL (ref 31.5–36.5)
MCV RBC AUTO: 86 FL (ref 78–100)
MONOCYTES # BLD AUTO: 0.5 10E9/L (ref 0–1.3)
MONOCYTES NFR BLD AUTO: 3 %
MUCOUS THREADS #/AREA URNS LPF: PRESENT /LPF
NEUTROPHILS # BLD AUTO: 14.2 10E9/L (ref 1.6–8.3)
NEUTROPHILS NFR BLD AUTO: 90 %
NITRATE UR QL: POSITIVE
PH UR STRIP: 5 PH (ref 5–7)
PLATELET # BLD AUTO: 273 10E9/L (ref 150–450)
PLATELET # BLD EST: ABNORMAL 10*3/UL
POTASSIUM SERPL-SCNC: 3.4 MMOL/L (ref 3.4–5.3)
PROT SERPL-MCNC: 6.8 G/DL (ref 6.8–8.8)
RBC # BLD AUTO: 4.41 10E12/L (ref 4.4–5.9)
RBC #/AREA URNS AUTO: 69 /HPF (ref 0–2)
RBC MORPH BLD: ABNORMAL
SODIUM SERPL-SCNC: 139 MMOL/L (ref 133–144)
SOURCE: ABNORMAL
SP GR UR STRIP: 1.03 (ref 1–1.03)
SPERM #/AREA URNS HPF: PRESENT /HPF
SQUAMOUS #/AREA URNS AUTO: <1 /HPF (ref 0–1)
UROBILINOGEN UR STRIP-MCNC: 4 MG/DL (ref 0–2)
WBC # BLD AUTO: 15.8 10E9/L (ref 4–11)
WBC #/AREA URNS AUTO: 120 /HPF (ref 0–5)
WBC CLUMPS #/AREA URNS HPF: PRESENT /HPF

## 2020-04-05 PROCEDURE — 96375 TX/PRO/DX INJ NEW DRUG ADDON: CPT | Performed by: EMERGENCY MEDICINE

## 2020-04-05 PROCEDURE — 85025 COMPLETE CBC W/AUTO DIFF WBC: CPT | Performed by: EMERGENCY MEDICINE

## 2020-04-05 PROCEDURE — 81001 URINALYSIS AUTO W/SCOPE: CPT | Performed by: EMERGENCY MEDICINE

## 2020-04-05 PROCEDURE — 87641 MR-STAPH DNA AMP PROBE: CPT | Performed by: HOSPITALIST

## 2020-04-05 PROCEDURE — 83036 HEMOGLOBIN GLYCOSYLATED A1C: CPT | Performed by: EMERGENCY MEDICINE

## 2020-04-05 PROCEDURE — 87640 STAPH A DNA AMP PROBE: CPT | Performed by: HOSPITALIST

## 2020-04-05 PROCEDURE — 99285 EMERGENCY DEPT VISIT HI MDM: CPT | Mod: 25 | Performed by: EMERGENCY MEDICINE

## 2020-04-05 PROCEDURE — 25000128 H RX IP 250 OP 636: Performed by: EMERGENCY MEDICINE

## 2020-04-05 PROCEDURE — 25800030 ZZH RX IP 258 OP 636: Performed by: EMERGENCY MEDICINE

## 2020-04-05 PROCEDURE — 99222 1ST HOSP IP/OBS MODERATE 55: CPT | Mod: AI | Performed by: HOSPITALIST

## 2020-04-05 PROCEDURE — 20000003 ZZH R&B ICU

## 2020-04-05 PROCEDURE — 96361 HYDRATE IV INFUSION ADD-ON: CPT | Performed by: EMERGENCY MEDICINE

## 2020-04-05 PROCEDURE — 99285 EMERGENCY DEPT VISIT HI MDM: CPT | Mod: Z6 | Performed by: EMERGENCY MEDICINE

## 2020-04-05 PROCEDURE — 96365 THER/PROPH/DIAG IV INF INIT: CPT | Performed by: EMERGENCY MEDICINE

## 2020-04-05 PROCEDURE — 80053 COMPREHEN METABOLIC PANEL: CPT | Performed by: EMERGENCY MEDICINE

## 2020-04-05 PROCEDURE — 74176 CT ABD & PELVIS W/O CONTRAST: CPT

## 2020-04-05 PROCEDURE — 87086 URINE CULTURE/COLONY COUNT: CPT | Performed by: EMERGENCY MEDICINE

## 2020-04-05 RX ORDER — POLYETHYLENE GLYCOL 3350 17 G/17G
17 POWDER, FOR SOLUTION ORAL DAILY PRN
Status: DISCONTINUED | OUTPATIENT
Start: 2020-04-05 | End: 2020-04-09

## 2020-04-05 RX ORDER — HYDRALAZINE HYDROCHLORIDE 20 MG/ML
10 INJECTION INTRAMUSCULAR; INTRAVENOUS EVERY 4 HOURS PRN
Status: DISCONTINUED | OUTPATIENT
Start: 2020-04-05 | End: 2020-04-11 | Stop reason: HOSPADM

## 2020-04-05 RX ORDER — MEROPENEM 1 G/1
1 INJECTION, POWDER, FOR SOLUTION INTRAVENOUS ONCE
Status: COMPLETED | OUTPATIENT
Start: 2020-04-05 | End: 2020-04-05

## 2020-04-05 RX ORDER — NALOXONE HYDROCHLORIDE 0.4 MG/ML
.1-.4 INJECTION, SOLUTION INTRAMUSCULAR; INTRAVENOUS; SUBCUTANEOUS
Status: DISCONTINUED | OUTPATIENT
Start: 2020-04-05 | End: 2020-04-11 | Stop reason: HOSPADM

## 2020-04-05 RX ORDER — MORPHINE SULFATE 2 MG/ML
1 INJECTION, SOLUTION INTRAMUSCULAR; INTRAVENOUS
Status: DISCONTINUED | OUTPATIENT
Start: 2020-04-05 | End: 2020-04-11 | Stop reason: HOSPADM

## 2020-04-05 RX ORDER — ONDANSETRON 4 MG/1
4 TABLET, ORALLY DISINTEGRATING ORAL EVERY 6 HOURS PRN
Status: DISCONTINUED | OUTPATIENT
Start: 2020-04-05 | End: 2020-04-11 | Stop reason: HOSPADM

## 2020-04-05 RX ORDER — FAMOTIDINE 10 MG
20 TABLET ORAL 2 TIMES DAILY
Status: DISCONTINUED | OUTPATIENT
Start: 2020-04-05 | End: 2020-04-11 | Stop reason: HOSPADM

## 2020-04-05 RX ORDER — DEXTROSE MONOHYDRATE 25 G/50ML
25-50 INJECTION, SOLUTION INTRAVENOUS
Status: DISCONTINUED | OUTPATIENT
Start: 2020-04-05 | End: 2020-04-11 | Stop reason: HOSPADM

## 2020-04-05 RX ORDER — ONDANSETRON 2 MG/ML
4 INJECTION INTRAMUSCULAR; INTRAVENOUS EVERY 6 HOURS PRN
Status: DISCONTINUED | OUTPATIENT
Start: 2020-04-05 | End: 2020-04-11 | Stop reason: HOSPADM

## 2020-04-05 RX ORDER — ENALAPRIL MALEATE 2.5 MG/1
5 TABLET ORAL DAILY
Status: DISCONTINUED | OUTPATIENT
Start: 2020-04-05 | End: 2020-04-06

## 2020-04-05 RX ORDER — HYDRALAZINE HYDROCHLORIDE 20 MG/ML
10 INJECTION INTRAMUSCULAR; INTRAVENOUS ONCE
Status: COMPLETED | OUTPATIENT
Start: 2020-04-05 | End: 2020-04-05

## 2020-04-05 RX ORDER — NICOTINE POLACRILEX 4 MG
15-30 LOZENGE BUCCAL
Status: DISCONTINUED | OUTPATIENT
Start: 2020-04-05 | End: 2020-04-11 | Stop reason: HOSPADM

## 2020-04-05 RX ORDER — MEROPENEM 1 G/1
1 INJECTION, POWDER, FOR SOLUTION INTRAVENOUS EVERY 8 HOURS
Status: DISCONTINUED | OUTPATIENT
Start: 2020-04-06 | End: 2020-04-09

## 2020-04-05 RX ORDER — KETOROLAC TROMETHAMINE 30 MG/ML
30 INJECTION, SOLUTION INTRAMUSCULAR; INTRAVENOUS ONCE
Status: COMPLETED | OUTPATIENT
Start: 2020-04-05 | End: 2020-04-05

## 2020-04-05 RX ORDER — LEVOTHYROXINE SODIUM 75 UG/1
75 TABLET ORAL DAILY
Status: DISCONTINUED | OUTPATIENT
Start: 2020-04-06 | End: 2020-04-11 | Stop reason: HOSPADM

## 2020-04-05 RX ORDER — HYDROCODONE BITARTRATE AND ACETAMINOPHEN 5; 325 MG/1; MG/1
1 TABLET ORAL EVERY 4 HOURS PRN
Status: DISCONTINUED | OUTPATIENT
Start: 2020-04-05 | End: 2020-04-06

## 2020-04-05 RX ORDER — LANOLIN ALCOHOL/MO/W.PET/CERES
3 CREAM (GRAM) TOPICAL
Status: DISCONTINUED | OUTPATIENT
Start: 2020-04-05 | End: 2020-04-11 | Stop reason: HOSPADM

## 2020-04-05 RX ORDER — ACETAMINOPHEN 325 MG/1
650 TABLET ORAL EVERY 4 HOURS PRN
Status: DISCONTINUED | OUTPATIENT
Start: 2020-04-05 | End: 2020-04-06

## 2020-04-05 RX ORDER — SODIUM CHLORIDE 9 MG/ML
1000 INJECTION, SOLUTION INTRAVENOUS CONTINUOUS
Status: DISCONTINUED | OUTPATIENT
Start: 2020-04-05 | End: 2020-04-05

## 2020-04-05 RX ORDER — LIDOCAINE 40 MG/G
CREAM TOPICAL
Status: DISCONTINUED | OUTPATIENT
Start: 2020-04-05 | End: 2020-04-11 | Stop reason: HOSPADM

## 2020-04-05 RX ORDER — SODIUM CHLORIDE 9 MG/ML
INJECTION, SOLUTION INTRAVENOUS CONTINUOUS
Status: DISCONTINUED | OUTPATIENT
Start: 2020-04-05 | End: 2020-04-06

## 2020-04-05 RX ADMIN — HYDRALAZINE HYDROCHLORIDE 10 MG: 20 INJECTION INTRAMUSCULAR; INTRAVENOUS at 22:23

## 2020-04-05 RX ADMIN — SODIUM CHLORIDE 1000 ML: 9 INJECTION, SOLUTION INTRAVENOUS at 21:02

## 2020-04-05 RX ADMIN — KETOROLAC TROMETHAMINE 30 MG: 30 INJECTION, SOLUTION INTRAMUSCULAR at 20:59

## 2020-04-05 RX ADMIN — MEROPENEM 1 G: 1 INJECTION, POWDER, FOR SOLUTION INTRAVENOUS at 21:18

## 2020-04-05 RX ADMIN — SODIUM CHLORIDE 1000 ML: 9 INJECTION, SOLUTION INTRAVENOUS at 20:10

## 2020-04-05 ASSESSMENT — ENCOUNTER SYMPTOMS
FREQUENCY: 1
FEVER: 1
FLANK PAIN: 1
BACK PAIN: 1
VOMITING: 0
DYSURIA: 1
SHORTNESS OF BREATH: 0
COUGH: 0
ABDOMINAL PAIN: 0
NAUSEA: 0
FATIGUE: 1

## 2020-04-05 NOTE — LETTER
[employer]  [facility] & [address] [place] [address]        Kamaljit Hellerianson  [id number] [address] [place] [id number]    Dear Kamaljit,     We are writing on behalf of [facility] and [facility]. [facility] is the department which coordinates documentation of decision-making authority.  Care providers are required to have copies of legal documents when a patient is unable to make their own health care decisions and the person has documented health care wishes and/or appointed a health care agent to make decisions on their behalf.     We have reviewed the Health Care Directive dated 1/26/2013 which you presented for addition to your medical record. Unfortunately, our review indicates the document is not legally valid for   the following reason: The document is missing pages 2- 10. In order to create a legal document you will need to send us the missing pages or another copy of the entire document. Please make sure you have signed and that your signature was witnessed by 2 people or notarized.     We greatly value the opportunity to assist you in documenting your choices and to honor your   wishes. We apologize for any inconvenience. We have several options to assist you in updating   your document so it meets legal requirements.       Health Care Directives and Advance Care Planning resources can be viewed and printed   for free at our web site:[email or web address].    COPIES of completed Health Care Directives can be brought or mailed to any of our   locations, including the address listed below. You can also email a copy to [email or web address] .      For additional assistance or questions you can email us at [email or web address] or call [phone]    Sincerely,     [facility] [employer]  [facility], [facility], and [address]  [place] [address]   [place] [address]   Email us: [email or web address] Call us: [phone]  Visit at: [email or web address]

## 2020-04-06 PROBLEM — K80.20 CALCULUS OF GALLBLADDER WITHOUT CHOLECYSTITIS WITHOUT OBSTRUCTION: Status: ACTIVE | Noted: 2020-04-06

## 2020-04-06 PROBLEM — R10.9 FLANK PAIN: Status: ACTIVE | Noted: 2020-04-06

## 2020-04-06 PROBLEM — Z85.21 HISTORY OF LARYNGEAL CANCER: Status: ACTIVE | Noted: 2020-04-06

## 2020-04-06 PROBLEM — D35.02 ADENOMA OF LEFT ADRENAL GLAND: Status: ACTIVE | Noted: 2020-04-06

## 2020-04-06 PROBLEM — Z88.9 MULTIPLE DRUG ALLERGIES: Status: ACTIVE | Noted: 2020-04-06

## 2020-04-06 PROBLEM — I10 SEVERE HYPERTENSION: Status: ACTIVE | Noted: 2020-04-06

## 2020-04-06 LAB
ANION GAP SERPL CALCULATED.3IONS-SCNC: 9 MMOL/L (ref 3–14)
BUN SERPL-MCNC: 16 MG/DL (ref 7–30)
CALCIUM SERPL-MCNC: 8.2 MG/DL (ref 8.5–10.1)
CHLORIDE SERPL-SCNC: 100 MMOL/L (ref 94–109)
CO2 SERPL-SCNC: 24 MMOL/L (ref 20–32)
CREAT SERPL-MCNC: 0.45 MG/DL (ref 0.66–1.25)
ERYTHROCYTE [DISTWIDTH] IN BLOOD BY AUTOMATED COUNT: 12.6 % (ref 10–15)
GFR SERPL CREATININE-BSD FRML MDRD: >90 ML/MIN/{1.73_M2}
GLUCOSE BLDC GLUCOMTR-MCNC: 226 MG/DL (ref 70–99)
GLUCOSE BLDC GLUCOMTR-MCNC: 250 MG/DL (ref 70–99)
GLUCOSE BLDC GLUCOMTR-MCNC: 263 MG/DL (ref 70–99)
GLUCOSE BLDC GLUCOMTR-MCNC: 267 MG/DL (ref 70–99)
GLUCOSE BLDC GLUCOMTR-MCNC: 308 MG/DL (ref 70–99)
GLUCOSE BLDC GLUCOMTR-MCNC: 380 MG/DL (ref 70–99)
GLUCOSE SERPL-MCNC: 236 MG/DL (ref 70–99)
HCT VFR BLD AUTO: 37.9 % (ref 40–53)
HGB BLD-MCNC: 13.3 G/DL (ref 13.3–17.7)
MCH RBC QN AUTO: 30.4 PG (ref 26.5–33)
MCHC RBC AUTO-ENTMCNC: 35.1 G/DL (ref 31.5–36.5)
MCV RBC AUTO: 87 FL (ref 78–100)
MRSA DNA SPEC QL NAA+PROBE: NEGATIVE
PLATELET # BLD AUTO: 248 10E9/L (ref 150–450)
POTASSIUM SERPL-SCNC: 3.2 MMOL/L (ref 3.4–5.3)
POTASSIUM SERPL-SCNC: 3.5 MMOL/L (ref 3.4–5.3)
RBC # BLD AUTO: 4.37 10E12/L (ref 4.4–5.9)
SODIUM SERPL-SCNC: 133 MMOL/L (ref 133–144)
SPECIMEN SOURCE: NORMAL
WBC # BLD AUTO: 18.2 10E9/L (ref 4–11)

## 2020-04-06 PROCEDURE — 85027 COMPLETE CBC AUTOMATED: CPT | Performed by: HOSPITALIST

## 2020-04-06 PROCEDURE — 36415 COLL VENOUS BLD VENIPUNCTURE: CPT | Performed by: PEDIATRICS

## 2020-04-06 PROCEDURE — 80048 BASIC METABOLIC PNL TOTAL CA: CPT | Performed by: HOSPITALIST

## 2020-04-06 PROCEDURE — 25000132 ZZH RX MED GY IP 250 OP 250 PS 637: Performed by: PEDIATRICS

## 2020-04-06 PROCEDURE — 25000132 ZZH RX MED GY IP 250 OP 250 PS 637: Performed by: HOSPITALIST

## 2020-04-06 PROCEDURE — 25000125 ZZHC RX 250: Performed by: PEDIATRICS

## 2020-04-06 PROCEDURE — 99233 SBSQ HOSP IP/OBS HIGH 50: CPT | Performed by: PEDIATRICS

## 2020-04-06 PROCEDURE — 99207 ZZC CDG-MDM COMPONENT: MEETS MODERATE - UP CODED: CPT | Performed by: PEDIATRICS

## 2020-04-06 PROCEDURE — 25000128 H RX IP 250 OP 636: Performed by: HOSPITALIST

## 2020-04-06 PROCEDURE — 12000000 ZZH R&B MED SURG/OB

## 2020-04-06 PROCEDURE — 25800030 ZZH RX IP 258 OP 636: Performed by: HOSPITALIST

## 2020-04-06 PROCEDURE — 00000146 ZZHCL STATISTIC GLUCOSE BY METER IP

## 2020-04-06 PROCEDURE — 36415 COLL VENOUS BLD VENIPUNCTURE: CPT | Performed by: HOSPITALIST

## 2020-04-06 PROCEDURE — 84132 ASSAY OF SERUM POTASSIUM: CPT | Performed by: PEDIATRICS

## 2020-04-06 RX ORDER — GLIPIZIDE 5 MG/1
5 TABLET ORAL
Status: DISCONTINUED | OUTPATIENT
Start: 2020-04-06 | End: 2020-04-06

## 2020-04-06 RX ORDER — POTASSIUM CHLORIDE 29.8 MG/ML
20 INJECTION INTRAVENOUS
Status: DISCONTINUED | OUTPATIENT
Start: 2020-04-06 | End: 2020-04-11 | Stop reason: HOSPADM

## 2020-04-06 RX ORDER — POTASSIUM CHLORIDE 1.5 G/1.58G
20-40 POWDER, FOR SOLUTION ORAL
Status: DISCONTINUED | OUTPATIENT
Start: 2020-04-06 | End: 2020-04-11 | Stop reason: HOSPADM

## 2020-04-06 RX ORDER — POTASSIUM CL/LIDO/0.9 % NACL 10MEQ/0.1L
10 INTRAVENOUS SOLUTION, PIGGYBACK (ML) INTRAVENOUS
Status: DISCONTINUED | OUTPATIENT
Start: 2020-04-06 | End: 2020-04-11 | Stop reason: HOSPADM

## 2020-04-06 RX ORDER — POTASSIUM CHLORIDE 1500 MG/1
20-40 TABLET, EXTENDED RELEASE ORAL
Status: DISCONTINUED | OUTPATIENT
Start: 2020-04-06 | End: 2020-04-11 | Stop reason: HOSPADM

## 2020-04-06 RX ORDER — OXYCODONE HYDROCHLORIDE 5 MG/1
5-10 TABLET ORAL EVERY 4 HOURS PRN
Status: DISCONTINUED | OUTPATIENT
Start: 2020-04-06 | End: 2020-04-11 | Stop reason: HOSPADM

## 2020-04-06 RX ORDER — OXYCODONE HYDROCHLORIDE 5 MG/1
10 TABLET ORAL EVERY 4 HOURS PRN
Status: DISCONTINUED | OUTPATIENT
Start: 2020-04-06 | End: 2020-04-06

## 2020-04-06 RX ORDER — GLIPIZIDE 5 MG/1
10 TABLET ORAL
Status: DISCONTINUED | OUTPATIENT
Start: 2020-04-07 | End: 2020-04-11 | Stop reason: HOSPADM

## 2020-04-06 RX ORDER — NAPROXEN 250 MG/1
500 TABLET ORAL 2 TIMES DAILY PRN
Status: DISCONTINUED | OUTPATIENT
Start: 2020-04-06 | End: 2020-04-11 | Stop reason: HOSPADM

## 2020-04-06 RX ORDER — METOPROLOL TARTRATE 1 MG/ML
5 INJECTION, SOLUTION INTRAVENOUS EVERY 4 HOURS PRN
Status: DISCONTINUED | OUTPATIENT
Start: 2020-04-06 | End: 2020-04-11 | Stop reason: HOSPADM

## 2020-04-06 RX ORDER — POTASSIUM CHLORIDE 7.45 MG/ML
10 INJECTION INTRAVENOUS
Status: DISCONTINUED | OUTPATIENT
Start: 2020-04-06 | End: 2020-04-11 | Stop reason: HOSPADM

## 2020-04-06 RX ORDER — OXYCODONE HYDROCHLORIDE 5 MG/1
5 TABLET ORAL EVERY 4 HOURS PRN
Status: DISCONTINUED | OUTPATIENT
Start: 2020-04-06 | End: 2020-04-06

## 2020-04-06 RX ORDER — ENALAPRIL MALEATE 2.5 MG/1
10 TABLET ORAL DAILY
Status: DISCONTINUED | OUTPATIENT
Start: 2020-04-07 | End: 2020-04-11 | Stop reason: HOSPADM

## 2020-04-06 RX ORDER — NICOTINE 21 MG/24HR
1 PATCH, TRANSDERMAL 24 HOURS TRANSDERMAL DAILY
Status: DISCONTINUED | OUTPATIENT
Start: 2020-04-06 | End: 2020-04-11 | Stop reason: HOSPADM

## 2020-04-06 RX ADMIN — MORPHINE SULFATE 1 MG: 2 INJECTION, SOLUTION INTRAMUSCULAR; INTRAVENOUS at 18:41

## 2020-04-06 RX ADMIN — OXYCODONE HYDROCHLORIDE 5 MG: 5 TABLET ORAL at 16:15

## 2020-04-06 RX ADMIN — MEROPENEM 1 G: 1 INJECTION, POWDER, FOR SOLUTION INTRAVENOUS at 14:57

## 2020-04-06 RX ADMIN — NICOTINE 1 PATCH: 21 PATCH TRANSDERMAL at 18:46

## 2020-04-06 RX ADMIN — HYDROCODONE BITARTRATE AND ACETAMINOPHEN 1 TABLET: 5; 325 TABLET ORAL at 04:05

## 2020-04-06 RX ADMIN — FAMOTIDINE 20 MG: 10 TABLET, FILM COATED ORAL at 08:11

## 2020-04-06 RX ADMIN — NICOTINE 1 PATCH: 21 PATCH TRANSDERMAL at 00:51

## 2020-04-06 RX ADMIN — INSULIN ASPART 5 UNITS: 100 INJECTION, SOLUTION INTRAVENOUS; SUBCUTANEOUS at 10:30

## 2020-04-06 RX ADMIN — OXYCODONE HYDROCHLORIDE 10 MG: 5 TABLET ORAL at 22:22

## 2020-04-06 RX ADMIN — METOPROLOL TARTRATE 5 MG: 5 INJECTION, SOLUTION INTRAVENOUS at 10:30

## 2020-04-06 RX ADMIN — ENALAPRIL MALEATE 5 MG: 2.5 TABLET ORAL at 00:04

## 2020-04-06 RX ADMIN — Medication 1 MG: at 00:03

## 2020-04-06 RX ADMIN — HYDRALAZINE HYDROCHLORIDE 10 MG: 20 INJECTION INTRAMUSCULAR; INTRAVENOUS at 19:46

## 2020-04-06 RX ADMIN — SERTRALINE HYDROCHLORIDE 50 MG: 50 TABLET ORAL at 08:11

## 2020-04-06 RX ADMIN — FAMOTIDINE 20 MG: 10 TABLET, FILM COATED ORAL at 20:35

## 2020-04-06 RX ADMIN — MEROPENEM 1 G: 1 INJECTION, POWDER, FOR SOLUTION INTRAVENOUS at 05:06

## 2020-04-06 RX ADMIN — SODIUM CHLORIDE: 9 INJECTION, SOLUTION INTRAVENOUS at 00:08

## 2020-04-06 RX ADMIN — ACETAMINOPHEN 650 MG: 325 TABLET, FILM COATED ORAL at 01:04

## 2020-04-06 RX ADMIN — INSULIN ASPART 4 UNITS: 100 INJECTION, SOLUTION INTRAVENOUS; SUBCUTANEOUS at 17:39

## 2020-04-06 RX ADMIN — POTASSIUM CHLORIDE 40 MEQ: 1500 TABLET, EXTENDED RELEASE ORAL at 16:15

## 2020-04-06 RX ADMIN — METFORMIN HYDROCHLORIDE 500 MG: 500 TABLET ORAL at 17:39

## 2020-04-06 RX ADMIN — HYDROCODONE BITARTRATE AND ACETAMINOPHEN 1 TABLET: 5; 325 TABLET ORAL at 08:10

## 2020-04-06 RX ADMIN — METOPROLOL TARTRATE 5 MG: 5 INJECTION, SOLUTION INTRAVENOUS at 17:38

## 2020-04-06 RX ADMIN — POTASSIUM CHLORIDE 20 MEQ: 1500 TABLET, EXTENDED RELEASE ORAL at 18:30

## 2020-04-06 RX ADMIN — OXYCODONE HYDROCHLORIDE 5 MG: 5 TABLET ORAL at 15:00

## 2020-04-06 RX ADMIN — SODIUM CHLORIDE: 9 INJECTION, SOLUTION INTRAVENOUS at 07:30

## 2020-04-06 RX ADMIN — HYDROCODONE BITARTRATE AND ACETAMINOPHEN 1 TABLET: 5; 325 TABLET ORAL at 00:03

## 2020-04-06 RX ADMIN — MEROPENEM 1 G: 1 INJECTION, POWDER, FOR SOLUTION INTRAVENOUS at 22:17

## 2020-04-06 RX ADMIN — ENALAPRIL MALEATE 5 MG: 2.5 TABLET ORAL at 06:44

## 2020-04-06 RX ADMIN — LEVOTHYROXINE SODIUM 75 MCG: 75 TABLET ORAL at 08:10

## 2020-04-06 RX ADMIN — HYDRALAZINE HYDROCHLORIDE 10 MG: 20 INJECTION INTRAMUSCULAR; INTRAVENOUS at 04:05

## 2020-04-06 RX ADMIN — MELATONIN TAB 3 MG 3 MG: 3 TAB at 22:22

## 2020-04-06 RX ADMIN — FAMOTIDINE 20 MG: 10 TABLET, FILM COATED ORAL at 00:03

## 2020-04-06 RX ADMIN — ACETAMINOPHEN 650 MG: 325 TABLET, FILM COATED ORAL at 05:06

## 2020-04-06 RX ADMIN — OXYCODONE HYDROCHLORIDE 5 MG: 5 TABLET ORAL at 10:31

## 2020-04-06 RX ADMIN — GLIPIZIDE 5 MG: 5 TABLET ORAL at 16:15

## 2020-04-06 ASSESSMENT — ACTIVITIES OF DAILY LIVING (ADL)
ADLS_ACUITY_SCORE: 12
SWALLOWING: 0-->SWALLOWS FOODS/LIQUIDS WITHOUT DIFFICULTY
FALL_HISTORY_WITHIN_LAST_SIX_MONTHS: NO
AMBULATION: 0-->INDEPENDENT
ADLS_ACUITY_SCORE: 12
TRANSFERRING: 0-->INDEPENDENT
RETIRED_EATING: 0-->INDEPENDENT
DRESS: 0-->INDEPENDENT
ADLS_ACUITY_SCORE: 12
TOILETING: 0-->INDEPENDENT
BATHING: 0-->INDEPENDENT
ADLS_ACUITY_SCORE: 15
RETIRED_COMMUNICATION: 0-->UNDERSTANDS/COMMUNICATES WITHOUT DIFFICULTY
COGNITION: 0 - NO COGNITION ISSUES REPORTED

## 2020-04-06 NOTE — PROVIDER NOTIFICATION
Patient's 0400 /108, he was given Hydralazine.  His re-check was 208/115. It is too soon for Hydralazine.  Discussed with provider and plan is to give the AM dose of Enalapril at this time.
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
## 2020-04-06 NOTE — ED NOTES
ED Nursing criteria listed below was addressed during verbal handoff:     Abnormal vitals: Yes  Abnormal results: Yes  Med Reconciliation completed: Yes  Meds given in ED: Yes  Any Overdue Meds: Yes  Core Measures: N/A  Isolation: Yes  Special needs: N/A  Skin assessment: Yes    Observation Patient  Education provided: N/A

## 2020-04-06 NOTE — PLAN OF CARE
S-(situation): Patient arrives to room 218 via cart from ED    B-(background): Emphysematous cystitis    A-(assessment): A&Ox4. VSS except elevated BP. LS clear. Apical pulse regular. CMS and neuros intact. BS active. Passing gas. Reporting normal appetite. Dysuria and low urine output, urine is very concentrated and tea colored. Skin intact. Lower right flank pain.     R-(recommendations): Orders reviewed with patient. Will monitor patient per MD orders.     Inpatient nursing criteria listed below were met:    Health care directives status obtained and documented: Yes  SCD's Documented: Yes  Skin issues/needs documented:NA  Isolation needs addressed, if appropriate: Yes  Fall Prevention: Care plan updated, Education given and documented Yes  MRSA swab completed for ICU admissions only: Yes  Care Plan initiated: Yes  Education Assessment documented:Yes  Education Documented (Pre-existing chronic infection such as, MRSA/VRE need education on admission): Yes  Admission Medication Reconciliation completed: Yes  New medication patient education completed and documented (Possible Side Effects of Common Medications handout): Yes  Home medications if not able to send immediately home with family stored here: NA  Reminder note placed in discharge instructions: NA  Discharge planning review completed (admission navigator) Yes

## 2020-04-06 NOTE — PROGRESS NOTES
[facility]    Medicine Progress Note - Hospitalist Service       Date of Admission:  4/5/2020  Assessment & Plan    55-year-old man with uncontrolled diabetes, uncontrolled hypertension, and multiple drug allergies including allergies to antibiotics admitted last night due to emphysematous cystitis with leukocytosis and recent fever at home that could be consistent with systemic inflammatory response syndrome and/or sepsis.  He apparently does not routinely follow-up for healthcare.    Principal Problem:    Emphysematous cystitis  Assessment: Presented with right flank pain, dysuria, abnormal urinalysis including positive nitrates and pyuria, and radiographic findings consistent with emphysematous cystitis of the bladder, little clinical change so far but has received only 2 doses of IV antibiotics, has persistent leukocytosis and had reported recurring fever to 101 at home prior to admission consistent with systemic inflammatory response syndrome although has not yet had fever during his hospital stay  Plan: Continue IV meropenem empirically while awaiting culture results because of his multiple antibiotic drug reactions, severity of this infection, and uncontrolled diabetes, adjust antibiotics depending upon culture results and clinical course    Active Problems:    Uncontrolled type 2 diabetes mellitus with hyperglycemia (H)  Assessment: Hemoglobin A1c 8.5, blood sugars consistently 200s to 300s during this hospital stay so far, has not been on any specific diabetic medication treatment at home and today expresses preference for oral medications if possible  Plan: Start oral glipizide and metformin, increase insulin correction scale to the high correction scale, adjust diabetes medications to optimize glycemic control to support healing from this infection      Severe hypertension  Assessment: Systolic blood pressure as high as 209 with diastolic blood pressure as high as 117, no  obvious symptoms or signs of acute organ injury from severe hypertension, noted to have hypopotassemia which raises question for possible underlying hyperaldosteronism and incidentally noted to have thickening of the left adrenal gland consistent with left adrenal adenoma, has not recently been taking medication treatments for hypertension  Plan: Continue enalapril which was started overnight and increase dosage tomorrow morning to 10 mg daily, check serum aldosterone and plasma renal activity and consider adding spironolactone for treatment of hyperaldosteronism, replace potassium according to potassium supplementation protocol, continue IV hydralazine and add IV metoprolol as needed for severely elevated blood pressure      Flank pain-right  Assessment: Clinically suspicious for pyelonephritis although this was not evident radiographically  Plan: Continue antibiotics, adjusting analgesics today by adding naproxen and switching Norco to oxycodone, continue IV narcotics if necessary for severe breakthrough pain      Calculus of gallbladder without cholecystitis without obstruction  Assessment: Incidental finding radiographically  Plan: No acute intervention      Adenoma of left adrenal gland-possible per CT findings  Assessment: Also incidental radiographic finding  Plan: Evaluation for hyperaldosteronism      Multiple drug allergies  Assessment: Includes previous adverse reactions to Augmentin, clindamycin, and sulfamethoxazole although at least some of these reactions may have been simple side effects of GI upset  Plan: Using IV meropenem empirically for now      MRSA  Assessment: Previous infection in 2008 with negative testing during this hospital stay, does not presently have open skin lesions and is not incontinent  Plan: According to current standards does not require isolation and rather will use standard precautions      Generalized anxiety disorder  Assessment: Chronic  Plan: Continuing chronic  medication      GERD (gastroesophageal reflux disease)  Assessment: Chronic  Plan: Continuing chronic medication      Obesity  Assessment: Chronic  Plan: No acute interventions      Hypothyroidism  Assessment: Chronic  Plan: Continuing chronic medication      History of laryngeal cancer  Assessment: Old history  Plan: No acute intervention      Diet: Combination Diet 3981-1157 Calories: Moderate Consistent CHO (4-6 CHO units/meal); Low Saturated Fat Na <2400mg Diet    DVT Prophylaxis: Low Risk/Ambulatory with no VTE prophylaxis indicated  Santos Catheter: not present  Code Status: Full Code      Disposition Plan   Expected discharge: 2 - 3 days, recommended to prior living arrangement once antibiotic plan established and Culture and antibiotic susceptibility test results are available.  Entered: Ernie Avendano MD 04/06/2020, 9:36 AM       The patient's care was discussed with the Bedside Nurse and Patient.    Ernie Avendano MD  Hospitalist Service  [facility]    ______________________________________________________________________    Interval History   He feels about the same overall today.  He continues to have pain in the right flank area.  He has been taking Tylenol for pain which does not help.  He also has been receiving Norco for pain which also has not helped.  Pain is constant and he does not identify aggravating or alleviating factors.  He had reported fever to 101 at home but so far in his hospital stay has not been significantly febrile although has been receiving regular doses of Tylenol.  Heart rate and respiratory rate have been normal.  Blood pressure continues to be elevated and has been as high as 209 systolic and 117 diastolic.  He has not had headache, chest pain, or shortness of breath.  He had been given enalapril about midnight last night and received a second 5 mg dose of enalapril about 6 AM this morning.  He also received a dose of IV hydralazine for  severely elevated blood pressure.  He continues to have pain with urination and reports some suprapubic pain.  He says he tends to get constipated although he has had a recent bowel movement.  He is voiding spontaneously.  Oxygenation has been normal.    Data reviewed today: I reviewed all medications, new labs and imaging results over the last 24 hours. I personally reviewed no images or EKG's today.    Physical Exam   Vital Signs: Temp: 98.7  F (37.1  C) Temp src: Oral BP: (!) 184/101 Pulse: 78 Heart Rate: 82 Resp: 16 SpO2: 97 % O2 Device: None (Room air)    Weight: 249 lbs 9.6 oz Body mass index is 31.2 kg/m .    General Appearance: No acute distress lying in bed  Respiratory: Normal respiratory effort, clear lungs  Cardiovascular: Regular rate and rhythm, good radial pulse, normal capillary refill  GI: Normal bowel sounds, obese, nondistended abdomen, soft, tender in the suprapubic area without peritoneal signs  Skin: Well-healed scars present on anterior abdominal wall, no rashes  Other: Alert and maintains wakefulness and attention    Data   Recent Labs   Lab 04/06/20  0639 04/05/20 2007   WBC 18.2* 15.8*   HGB 13.3 13.4   MCV 87 86    273    139   POTASSIUM 3.2* 3.4   CHLORIDE 100 101   CO2 24 28   BUN 16 17   CR 0.45* 0.52*   ANIONGAP 9 10   CASIMIRO 8.2* 8.6   * 276*   ALBUMIN  --  2.2*   PROTTOTAL  --  6.8   BILITOTAL  --  0.9   ALKPHOS  --  169*   ALT  --  10   AST  --  10     Urine culture is pending  Nasal testing for MRSA PCR was negative    Recent Results (from the past 24 hour(s))   CT Abdomen Pelvis w/o Contrast    Narrative    CT ABDOMEN AND PELVIS WITHOUT CONTRAST  4/5/2020 8:26 PM    HISTORY:  Flank pain, recurrent stone disease suspected - right.    TECHNIQUE: Scans obtained from the diaphragm through the pelvis  without oral or IV contrast.   Radiation dose for this scan was reduced using automated exposure  control, adjustment of the mA and/or kV according to patient size,  or  iterative reconstruction technique.    COMPARISON:  CT abdomen and pelvis dated 9/11/2014.    FINDINGS:   Partial fusion of the anterior left SI joint is noted. There are mild  degenerative changes of the right SI joint. Degenerative changes are  noted in the spine. No aggressive osseous lesions or acute osseous  fractures. Visualized portions of the lung bases and mediastinal  contents are grossly unremarkable.    Multiple dependently layering calculi are seen in the gallbladder  measuring up to approximately 0.7 cm in diameter each. The liver,  gallbladder, pancreas, spleen, and bilateral kidneys are otherwise  normal in appearance for a noncontrast CT. There is mild thickening of  the left adrenal gland which is stable since the prior CT from 2014  measuring up to 1.2 cm in thickness with an average density of 3  Hounsfield units. This is most consistent with adenomatous change. No  hydronephrosis, nephrolithiasis, hydroureter or ureteral calculus is  identified.    There is gas in the urinary bladder wall consistent with emphysematous  cystitis. No calculus is identified. Urinary bladder is mildly  distended.    There is nonaneurysmal atherosclerosis. No adenopathy, free fluid or  free air is seen in the peritoneal cavity. There is a small  fat-containing left inguinal hernia.    Prostate gland contains dystrophic calcifications but is otherwise  unremarkable. The colon is of normal caliber without pericolonic  inflammatory change to suggest acute diverticulitis. Appendix extends  posteriorly and superiorly from the cecum and is grossly normal in  appearance. Small bowel is of normal caliber. Stomach is mostly  decompressed but otherwise unremarkable.      Impression    IMPRESSION:  1. Emphysematous cystitis. This has a high association with diabetes  mellitus.  2. Nonaneurysmal atherosclerosis.  3. Cholelithiasis.  4. Stable left adrenal adenomata.  5. Evaluation of the solid organs of the abdomen and  pelvis is limited  due to lack of IV contrast.  6. Stable, tiny, fat-containing, left, inguinal hernia.    I discussed the findings of emphysematous cystitis with Dr. Condon  on 4/5/2020 at 8:39 PM.    MORENA APPIAH MD     Medications     sodium chloride 100 mL/hr at 04/06/20 0730       enalapril  5 mg Oral Daily     famotidine  20 mg Oral BID     insulin aspart  1-7 Units Subcutaneous TID AC     insulin aspart  1-5 Units Subcutaneous At Bedtime     levothyroxine  75 mcg Oral Daily     meropenem  1 g Intravenous Q8H     nicotine  1 patch Transdermal Daily     nicotine   Transdermal Q8H     sertraline  50 mg Oral Daily     sodium chloride (PF)  3 mL Intracatheter Q8H

## 2020-04-06 NOTE — PLAN OF CARE
VSS, except for BP.  Still HTN.  Using PRN hydralazine and metoprolol.  On RA.  Lungs clear.  A&O x4.   and 380.  Given sliding scale insulin.  Good appetite.  Patient continues to have right lower flank pain.  Pain medications changed to attempt to improve pain control.  Urine is tea/anand colored.  Patient c/o frequency, however he states this is normal for him.  Uses urinal at bedside.  Continue IV merrem q8 hrs.  Continue to monitor.

## 2020-04-06 NOTE — ED NOTES
Updated wife & Wife talked with patient about staying in the hospital.  Patient has agreed to be admitted.

## 2020-04-06 NOTE — PLAN OF CARE
S: Shift note    B: Emphysematous cystitis    A: A&Ox4. VSS except elevated BP, prn hydralazine given. LS clear. Apical pulse regular. CMS and neuros intact. BS active. Passing gas. Reporting normal appetite. Dysuria and low urine output, urine is very concentrated and tea colored. Skin intact. Lower right flank pain. New PIV placed in right hand with NS infusing at 100. BG at bedtime was 267 and 0400 was 308. PRN tylenol and norco given for pain and melatonin at bedtime to aid sleep. Patient appeared to be sleeping and resting comfortably upon rounding.     R: Continue with plan of care
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
## 2020-04-06 NOTE — PROVIDER NOTIFICATION
Discussed with Dr. TENA Patient's history of MRSA.  At this time plan is to keep patient in contact isolation obtain an MRSA swab, and if negative then discontinue isolation precautions.

## 2020-04-07 LAB
ANION GAP SERPL CALCULATED.3IONS-SCNC: 6 MMOL/L (ref 3–14)
BACTERIA SPEC CULT: NORMAL
BUN SERPL-MCNC: 21 MG/DL (ref 7–30)
CALCIUM SERPL-MCNC: 8.4 MG/DL (ref 8.5–10.1)
CHLORIDE SERPL-SCNC: 96 MMOL/L (ref 94–109)
CO2 SERPL-SCNC: 26 MMOL/L (ref 20–32)
CREAT SERPL-MCNC: 0.49 MG/DL (ref 0.66–1.25)
ERYTHROCYTE [DISTWIDTH] IN BLOOD BY AUTOMATED COUNT: 12.6 % (ref 10–15)
GFR SERPL CREATININE-BSD FRML MDRD: >90 ML/MIN/{1.73_M2}
GLUCOSE BLDC GLUCOMTR-MCNC: 183 MG/DL (ref 70–99)
GLUCOSE BLDC GLUCOMTR-MCNC: 188 MG/DL (ref 70–99)
GLUCOSE BLDC GLUCOMTR-MCNC: 197 MG/DL (ref 70–99)
GLUCOSE BLDC GLUCOMTR-MCNC: 269 MG/DL (ref 70–99)
GLUCOSE BLDC GLUCOMTR-MCNC: 99 MG/DL (ref 70–99)
GLUCOSE SERPL-MCNC: 300 MG/DL (ref 70–99)
HCT VFR BLD AUTO: 37.3 % (ref 40–53)
HGB BLD-MCNC: 13.4 G/DL (ref 13.3–17.7)
LACTATE BLD-SCNC: 1.9 MMOL/L (ref 0.7–2)
Lab: NORMAL
MCH RBC QN AUTO: 30.7 PG (ref 26.5–33)
MCHC RBC AUTO-ENTMCNC: 35.9 G/DL (ref 31.5–36.5)
MCV RBC AUTO: 85 FL (ref 78–100)
PLATELET # BLD AUTO: 298 10E9/L (ref 150–450)
POTASSIUM SERPL-SCNC: 3.4 MMOL/L (ref 3.4–5.3)
PROCALCITONIN SERPL-MCNC: 0.79 NG/ML
RBC # BLD AUTO: 4.37 10E12/L (ref 4.4–5.9)
SODIUM SERPL-SCNC: 128 MMOL/L (ref 133–144)
SODIUM SERPL-SCNC: 132 MMOL/L (ref 133–144)
SPECIMEN SOURCE: NORMAL
T4 FREE SERPL-MCNC: 0.97 NG/DL (ref 0.76–1.46)
TSH SERPL DL<=0.005 MIU/L-ACNC: 25.63 MU/L (ref 0.4–4)
WBC # BLD AUTO: 16.1 10E9/L (ref 4–11)

## 2020-04-07 PROCEDURE — 36415 COLL VENOUS BLD VENIPUNCTURE: CPT | Performed by: FAMILY MEDICINE

## 2020-04-07 PROCEDURE — 36415 COLL VENOUS BLD VENIPUNCTURE: CPT | Performed by: PEDIATRICS

## 2020-04-07 PROCEDURE — 82088 ASSAY OF ALDOSTERONE: CPT | Performed by: PEDIATRICS

## 2020-04-07 PROCEDURE — 85027 COMPLETE CBC AUTOMATED: CPT | Performed by: FAMILY MEDICINE

## 2020-04-07 PROCEDURE — 25000128 H RX IP 250 OP 636: Performed by: HOSPITALIST

## 2020-04-07 PROCEDURE — 00000146 ZZHCL STATISTIC GLUCOSE BY METER IP

## 2020-04-07 PROCEDURE — 25000132 ZZH RX MED GY IP 250 OP 250 PS 637: Performed by: HOSPITALIST

## 2020-04-07 PROCEDURE — 25000132 ZZH RX MED GY IP 250 OP 250 PS 637: Performed by: PEDIATRICS

## 2020-04-07 PROCEDURE — 84443 ASSAY THYROID STIM HORMONE: CPT | Performed by: FAMILY MEDICINE

## 2020-04-07 PROCEDURE — 84244 ASSAY OF RENIN: CPT | Performed by: PEDIATRICS

## 2020-04-07 PROCEDURE — 80048 BASIC METABOLIC PNL TOTAL CA: CPT | Performed by: FAMILY MEDICINE

## 2020-04-07 PROCEDURE — 83605 ASSAY OF LACTIC ACID: CPT | Performed by: PEDIATRICS

## 2020-04-07 PROCEDURE — 25000125 ZZHC RX 250: Performed by: PEDIATRICS

## 2020-04-07 PROCEDURE — 40001081 ZZHCL STATISICAL ALDOSTERONE/RENIN RATIO: Performed by: PEDIATRICS

## 2020-04-07 PROCEDURE — 84145 PROCALCITONIN (PCT): CPT | Performed by: FAMILY MEDICINE

## 2020-04-07 PROCEDURE — 84295 ASSAY OF SERUM SODIUM: CPT | Performed by: FAMILY MEDICINE

## 2020-04-07 PROCEDURE — 84439 ASSAY OF FREE THYROXINE: CPT | Performed by: FAMILY MEDICINE

## 2020-04-07 PROCEDURE — 99233 SBSQ HOSP IP/OBS HIGH 50: CPT | Performed by: FAMILY MEDICINE

## 2020-04-07 PROCEDURE — 25000132 ZZH RX MED GY IP 250 OP 250 PS 637: Performed by: FAMILY MEDICINE

## 2020-04-07 PROCEDURE — 12000000 ZZH R&B MED SURG/OB

## 2020-04-07 RX ORDER — METOPROLOL TARTRATE 25 MG/1
25 TABLET, FILM COATED ORAL 2 TIMES DAILY
Status: DISCONTINUED | OUTPATIENT
Start: 2020-04-07 | End: 2020-04-09

## 2020-04-07 RX ORDER — NICOTINE 21 MG/24HR
1 PATCH, TRANSDERMAL 24 HOURS TRANSDERMAL ONCE
Status: COMPLETED | OUTPATIENT
Start: 2020-04-07 | End: 2020-04-08

## 2020-04-07 RX ADMIN — MELATONIN TAB 3 MG 3 MG: 3 TAB at 21:51

## 2020-04-07 RX ADMIN — SERTRALINE HYDROCHLORIDE 50 MG: 50 TABLET ORAL at 09:40

## 2020-04-07 RX ADMIN — NAPROXEN 500 MG: 250 TABLET ORAL at 00:46

## 2020-04-07 RX ADMIN — HYDRALAZINE HYDROCHLORIDE 10 MG: 20 INJECTION INTRAMUSCULAR; INTRAVENOUS at 00:20

## 2020-04-07 RX ADMIN — METFORMIN HYDROCHLORIDE 500 MG: 500 TABLET ORAL at 17:09

## 2020-04-07 RX ADMIN — GLIPIZIDE 10 MG: 5 TABLET ORAL at 09:40

## 2020-04-07 RX ADMIN — ENALAPRIL MALEATE 10 MG: 2.5 TABLET ORAL at 09:40

## 2020-04-07 RX ADMIN — NICOTINE 1 PATCH: 21 PATCH TRANSDERMAL at 00:46

## 2020-04-07 RX ADMIN — FAMOTIDINE 20 MG: 10 TABLET, FILM COATED ORAL at 21:10

## 2020-04-07 RX ADMIN — METFORMIN HYDROCHLORIDE 500 MG: 500 TABLET ORAL at 09:41

## 2020-04-07 RX ADMIN — LEVOTHYROXINE SODIUM 75 MCG: 75 TABLET ORAL at 09:40

## 2020-04-07 RX ADMIN — MEROPENEM 1 G: 1 INJECTION, POWDER, FOR SOLUTION INTRAVENOUS at 21:23

## 2020-04-07 RX ADMIN — MEROPENEM 1 G: 1 INJECTION, POWDER, FOR SOLUTION INTRAVENOUS at 15:20

## 2020-04-07 RX ADMIN — HYDRALAZINE HYDROCHLORIDE 10 MG: 20 INJECTION INTRAMUSCULAR; INTRAVENOUS at 04:57

## 2020-04-07 RX ADMIN — OXYCODONE HYDROCHLORIDE 10 MG: 5 TABLET ORAL at 15:51

## 2020-04-07 RX ADMIN — METOPROLOL TARTRATE 5 MG: 5 INJECTION, SOLUTION INTRAVENOUS at 01:02

## 2020-04-07 RX ADMIN — OXYCODONE HYDROCHLORIDE 10 MG: 5 TABLET ORAL at 05:09

## 2020-04-07 RX ADMIN — METOPROLOL TARTRATE 25 MG: 25 TABLET, FILM COATED ORAL at 21:10

## 2020-04-07 RX ADMIN — GLIPIZIDE 10 MG: 5 TABLET ORAL at 17:09

## 2020-04-07 RX ADMIN — OXYCODONE HYDROCHLORIDE 10 MG: 5 TABLET ORAL at 09:41

## 2020-04-07 RX ADMIN — MEROPENEM 1 G: 1 INJECTION, POWDER, FOR SOLUTION INTRAVENOUS at 05:10

## 2020-04-07 RX ADMIN — NICOTINE 1 PATCH: 21 PATCH TRANSDERMAL at 21:10

## 2020-04-07 RX ADMIN — INSULIN ASPART 2 UNITS: 100 INJECTION, SOLUTION INTRAVENOUS; SUBCUTANEOUS at 11:58

## 2020-04-07 RX ADMIN — OXYCODONE HYDROCHLORIDE 10 MG: 5 TABLET ORAL at 20:01

## 2020-04-07 RX ADMIN — MORPHINE SULFATE 1 MG: 2 INJECTION, SOLUTION INTRAMUSCULAR; INTRAVENOUS at 18:45

## 2020-04-07 RX ADMIN — FAMOTIDINE 20 MG: 10 TABLET, FILM COATED ORAL at 09:40

## 2020-04-07 RX ADMIN — NAPROXEN 500 MG: 250 TABLET ORAL at 11:59

## 2020-04-07 ASSESSMENT — ACTIVITIES OF DAILY LIVING (ADL)
ADLS_ACUITY_SCORE: 12

## 2020-04-07 ASSESSMENT — PAIN DESCRIPTION - DESCRIPTORS: DESCRIPTORS: SHARP

## 2020-04-07 NOTE — PROGRESS NOTES
BP continues to be elevated, 218/102. Metroprolol 5mg IV given. Pt rated Right flank pain 10/10, radiating to Right hip. Has received Roxicodone PO and Morphine IV, appears to be resting between cares and after meds given, but c/o's of pain when has urge to void. Voiding frequently 150ml to 300ml at a time. Will continue to monitor pain level and BP's.

## 2020-04-07 NOTE — PLAN OF CARE
VSS except of BP. BP was 227/105 PRN hydralazine 10mg given. Recheck of BP was 190/95. Pt A&Ox4. Pain in lower right flank. Oxy 10mg given. Merrem was given without complications. IV saline locked. BS was 250 with 3 units given. K+ recheck was 3.5. Pt sleeping most of shift. Will continue to monitor.

## 2020-04-07 NOTE — PROGRESS NOTES
I was called to discuss Kamaljit Montes De Oca  case through the [facility] ID advice line. The case discussion consisted on phone conversation with the patient's medical provider (Dr. Rodriguez) and chart review. I did not have face to face contact with the patient and did not perform physical examination.     Mr. Kamaljit Montes De Oca is a 55 year-old male with PMHx of DM 2, HTN, Hypothyroidism, GERD and anxiety who was admitted to [facility] on 4/5 with right flank pain for 1 week and reported fever of 101. In addition, per reports he was having dysuria.     Upon admission he was afebrile but had elevated white count of 15.8 and then 18.2. Additional labs showed normal creatinine and alk phos slightly elevated at 169 with normal AST and ALT. His Hb A1C was elevated at 8.5%. UA was abnormal with positive LE, positive nitrite, WBC clumps, bacteria, blood and protein. CT abdomen and pelvis was significant for emphysematous cystitis.     Patient was started on meropenem empirically on 4.5 (history of reaction to augmentin and clindamycin as well as sulfa). He is afebrile and had some improvement of white count but it is still 16.1 today. He still has urinary symptoms. Urine culture came back with <10,000 colonies/mL of mixed urogenital tip. MRSA swan is negative.    I was asked about antibiotic management. Please see my recommendations below:    1. Given no positivity of urine culture to guide antibiotic therapy, but clinical symptoms of UTI and CT evidence of emphysematous cystitis, I recommend to continue meropenem at this moment and close monitoring of clinical status    2. Given evidence of emphysematous cystitis, I recommend urology evaluation, ideally over the next 24-48, especially if no significant clinical improvement over the next 24-48h (earlier if clinical deterioration)    3. Duration of antibiotic therapy will depend on clinical evolution    Please feel free to  re-contact us if any additional question    Irene Sanders

## 2020-04-07 NOTE — PROGRESS NOTES
"CLINICAL NUTRITION SERVICES  -  ASSESSMENT NOTE      RECOMMENDATIONS FOR MD/PROVIDER TO ORDER: none   Recommendations Ordered by Registered Dietitian (RD):   -none   Future/Additional Recommendations:   -continue to monitor and encourage adequate oral intake   Malnutrition: Unable to determine due to RD seeing pt's based on provider discretion/approval (no NFPA)        REASON FOR ASSESSMENT  Kamaljit Montes De Oca is a 55 year old male seen by Registered Dietitian for Admission Nutrition Risk Screen for new/uncontrolled diabetes      NUTRITION HISTORY  Information obtained from EMR:  Principle problem:  Emphysematous cystitis    Active Problems: uncontrolled erik 2 diabetes mellitus with hyperglycemia, severe hypertension, GERD, generalized anxiety disorder, obesity, hypothyroidism    -pt not regularly seeing provider and not taking his medication for diabetes nor managing BP    Information obtained from Pt:  -unable to talk with patient today. Sleeping soundly during attempts  -will attempt again tomorrow primarily to provide Nutrition education    CURRENT NUTRITION ORDERS  Diet Order:     Orders Placed This Encounter      Combination Diet 3506-8601 Calories: Moderate Consistent CHO (4-6 CHO units/meal); Low Saturated Fat Na <2400mg Diet      Current Intake/Tolerance:  -100% of bfast - 4.7.20  -100%, 25% - 4.6.20      NUTRITION FOCUSED PHYSICAL ASSESSMENT FOR DIAGNOSING MALNUTRITION)    No:  RD not seeing pt's in-person per MD discretion/approval           ANTHROPOMETRICS  Height: 6' 3\"   Weight: 249 lbs 9.6 oz (113 kg)  Body mass index is 31.2 kg/m .  Weight Status:  Obesity Grade I BMI 30-34.9  IBW: 196 lbs (89.1 kg)  % IBW: 127  Weight History:   Wt Readings from Last 5 Encounters:   04/05/20 113.2 kg (249 lb 9.6 oz)   07/09/18 121.4 kg (267 lb 9.6 oz)   06/21/18 113.4 kg (250 lb)   09/12/14 110.5 kg (243 lb 9.7 oz)   12/05/13 116.3 kg (256 lb 4.8 oz)   -fairly stable wt for many years based on EMR data. Gains and " losses of ~15 lbs       LABS  Labs reviewed  Sodium (mmol/L)   Date Value   04/07/2020 128 (L)   04/06/2020 133   04/05/2020 139     Potassium (mmol/L)   Date Value   04/07/2020 3.4   04/06/2020 3.5   04/06/2020 3.2 (L)     Glucose (mg/dL)   Date Value   04/07/2020 300 (H)   04/06/2020 236 (H)   04/05/2020 276 (H)   06/21/2018 189 (H)     Hemoglobin A1C (%)   Date Value   04/05/2020 8.5 (H)   07/09/2018 6.7 (H)   09/13/2014 5.7       BP's for this hospital admission:  4.7.20   170/82 (H)  153/78 (H)  173/81 (H)  160/72 (H)  147/74 (H)  133/76 (WNL)        MEDICATIONS  Medications reviewed  Current Facility-Administered Medications (nutrition-relevant)   Medication     enalapril (VASOTEC)      famotidine (PEPCID)     glipiZIDE (GLUCOTROL)     hydrALAZINE (APRESOLINE)     insulin aspart (NovoLOG) injection (RAPID ACTING)     levothyroxine (SYNTHROID/LEVOTHROID)      metFORMIN (GLUCOPHAGE)      metoprolol (LOPRESSOR)     potassium chloride ER (KLOR-CON M) for replacement protocol     sertraline (ZOLOFT)        ASSESSED NUTRITION NEEDS PER APPROVED PRACTICE GUIDELINES:    Dosing Weight 95.1 kg (adjusted BW)  Estimated Energy Needs: 8170-7069 kcals (20-25 Kcal/Kg)  Justification: maintenance and obese  Estimated Protein Needs: 107-134 grams protein (1.2-1.5 g pro/Kg IBW)  Justification: maintenance, obesity guidelines  and preservation of lean body mass  Estimated Fluid Needs: 0614-6639 mL (1 mL/Kcal)  Justification: maintenance    MALNUTRITION:  % Weight Loss:  None noted  % Intake: unable to assess  Subcutaneous Fat Loss: unable to assess  Muscle Loss: unable to assess  Fluid Retention:  None noted    Malnutrition Diagnosis: Unable to determine due to RD seeing pt's based on provider discretion/approval. Based on EMR data, not suspected      NUTRITION DIAGNOSIS:  Not ready for diet and lifestyle change related to diagnoses: T2DM and HTN as evidenced by pt reporting not taking medications for either condition, pt not  practicing dietary recommendations for conditions, pt Body mass index is 31.2 kg/m .      NUTRITION INTERVENTIONS  Recommendations / Nutrition Prescription  -continue to monitor and encourage adequate oral intake    Implementation  Nutrition education:   -future attempts will be made to Provide education on DM-T2 Nutrition Therapy, HTN Nutrition Therapy   -Pt's nurse has present understanding diabetes folder for review    Composition of Meals and Snacks and General/healthful diet    Nutrition Goals  -pt to consume >75% of meals TID  -pt to practice carb counting with presented Mod Carb meals during current admission     MONITORING AND EVALUATION:  Progress towards goals will be monitored and evaluated per protocol and Practice Guidelines, Food intake, Weight, Food and Nutrition Knowledge/Skill, Readiness to change nutrition-related behavior and Biochemical data      Felisha Johnston MBA RD LD  Clinical Dietitian  [phone]

## 2020-04-07 NOTE — PLAN OF CARE
Temp: 98.3  F (36.8  C) Temp src: Oral BP: 133/76 Pulse: 69 Heart Rate: 69 Resp: 18 SpO2: 98 % O2 Device: None (Room air)    Pt c/o right flank pain, decreased with prn oxycodone and naproxen.  Pt is alert and oriented.  Lung sounds clear.  Active bowel sounds, pt c/o lower abdominal tenderness.  Sodium 132 at 1400 redraw.  K+ 3.4 this AM, no replacement necessary.  Will continue to monitor

## 2020-04-07 NOTE — PLAN OF CARE
Pain to R flank and lower back controlled with prn Oxycodone and Naproxen. Pt has slept between cares, has been calm and cooperative. Had large incontinent stool in bed at 2300 last evening. BP have been elevated, see MAR and vs f/s- hydralazine and metoprolol given during night per prn parameters, /74 at this time after intervention. Pt strains to void but denies pain with voiding, good UOP. Temp max 99.4 (o). New nicotine patch place at beginning of night as previous patch had fallen off.  overnight. Will continue with plan of care, monitor vs, IV abx, pain control.

## 2020-04-07 NOTE — PROGRESS NOTES
[facility]    Medicine Progress Note - Hospitalist Service       Date of Admission:  4/5/2020  Assessment & Plan    Patient is a 55-year-old gentleman with uncontrolled diabetes, uncontrolled hypertension, and numerous drug sensitivities who presented with emphysematous cystitis with leukocytosis, fever up to 101 in the home environment and clinical picture concerning for SIRS vs sepsis development.  He has been started on IV meropenem and over the past 24 hours does feel as though his symptoms are starting to clinically improve with patient's white blood cell count now starting to trend downward and patient been afebrile since time of admission.  His urine culture is growing less than 10,000 mixed urogenital tip.  Did discuss this patient with infectious disease given the severity of his initial illness as well as lack of culture identification and they are recommending ongoing meropenem at this time with close monitoring for clinical improvement over the next 24 to 48 hours with consideration of transfer for urological evaluation if patient starts having signs of worsening prior to that time.  At this time, attempting to identify whether or not urology would be available in-house at any point during this hospitalization or if patient would need transfer for urological consultation if it is necessary during this hospitalization.    Principal Problem:    Emphysematous cystitis    Assessment: Identified on CT scan imaging and patient with right flank pain, dysuria, and abnormal urinalysis with positive nitrates and significant pyuria with patient on IV meropenem but urine culture showing no significant organism growth    Plan: Continue with IV meropenem for the next 48 hours as outlined above with consideration of transfer for urological evaluation if patient shows any signs of clinical worsening.  We will attempt to identify whether or not Dr. Mcmahon or Dr. Torres will be available  for evaluation later this week if needed.      Active Problems:    Flank pain-right    Assessment: present at time of admission - ongoing but patient reports slightly improved compared to time of admission.  No evidence of pyelonephritis on CT scan imaging from time of admission    Plan: We will continue with IV meropenem as above and monitor for ongoing improvement of symptoms.      Severe hypertension    Assessment: Systolic blood pressure was in the 200s systolic and 110s diastolic on initial presentation in patient with known hypertension not recently taking any medication for this.  Work-up is in place for possible hyperaldosteronism as the patient was noted to have hypophosphatemia in addition to having a thickening of the left adrenal gland consistent with a left adrenal adenoma.  Patient has been started on enalapril and has required numerous doses of IV metoprolol and hydralazine with blood pressures finally in the 1 30-1 40 range systolic this morning.    Plan: We will continue with enalapril 10 mg daily and start scheduled metoprolol 25 mg twice daily with ongoing IV hydralazine for significant breakthrough hypertension.  Continue to monitor and adjust oral antihypertensive regiment daily.  If work-up does reveal concern for hyperaldosteronism, would consider addition of spironolactone going forward      Multiple drug allergies    Assessment: On review of patient's chart, he does not have true drug allergies but rather has side effect intolerances, mostly GI related    Plan: Continue with IV meropenem as per infectious disease recommendation for now however as discussed if patient has significant clinical improvement in transfer for urological evaluation is not necessary could consider trial of ciprofloxacin during this hospitalization with close monitoring for ongoing clinical improvement going forward.      MRSA    Assessment: Previous history of infection in 2008 with negative testing during this stay  and patient does not have open skin lesion or incontinence therefore contact isolation has not been maintained during this hospitalization due to current isolation standards    Plan: No contact isolation needed going forward      History of laryngeal cancer    Assessment: Previous history without known reoccurrence    Plan: No acute intervention is needed      Generalized anxiety disorder    Assessment: Chronic and stable with patient somewhat consistently taking his home regimen    Plan: Continue sertraline without change      Uncontrolled type 2 diabetes mellitus with hyperglycemia (H)    Assessment: uncontrolled with hemoglobin A1c 8.5 and blood sugars consistently in the 200-300 range during this hospital stay, not on any medication prior to this hospitalization.  He has been started on glipizide and metformin in addition to ongoing high correction sliding scale insulin with blood sugars trending downward    Plan: Continue with glipizide 10 mg twice daily as well as metformin 500 mg twice daily in addition to ongoing a high correction sliding scale insulin and monitor for ongoing blood sugar improvement.      GERD (gastroesophageal reflux disease)    Assessment: Well controlled with Pepcid    Plan: Continue home regimen without change      Hypothyroidism    Assessment: On Synthroid, however unclear if patient has been can taking this consistently    Plan: We will recheck TSH and free T4 as it has been over a year since patient's last evaluation.  Even if the value is slightly abnormal, would likely not change his current dosing but encouraged consistent administration and recheck in 6 weeks for reevaluation.      Calculus of gallbladder without cholecystitis without obstruction    Assessment: Incidental finding with patient asymptomatic    Plan: No acute intervention is needed       Adenoma of left adrenal gland-possible per CT findings    Assessment: incidental radiographic finding during this hospitalization     Plan: Continue with work-up for hypoaldosteronism as above      Obesity    Assessment: Chronic and stable    Plan: No acute intervention is needed         Diet: Combination Diet 2032-3292 Calories: Moderate Consistent CHO (4-6 CHO units/meal); Low Saturated Fat Na <2400mg Diet    DVT Prophylaxis: Low Risk/Ambulatory with no VTE prophylaxis indicated  Santos Catheter: not present  Code Status: Full Code      Disposition Plan   Expected discharge: 2-4 days, recommended to prior living arrangement once patient continues to improve as oral antibiotics are started.  Entered: Jasmyne Condon MD 04/07/2020, 2:08 PM       The patient's care was discussed with the Bedside Nurse, Care Coordinator/ and Patient and Dr. Sanders, infectious disease specialist from the [facility] Advice line.  Attempted to call patient's wife, Do, twice during the day with no response and no way to leave a message to return the call.     Jasmyne Condon MD  Hospitalist Service  [facility]    ______________________________________________________________________    Interval History   Patient's blood pressures remain consistently elevated and patient received 2 doses of IV hydralazine as well as 1 dose of IV metoprolol overnight with blood pressures now improving into the 1 30-1 40 range systolic.  Patient has been afebrile with temperatures in the 98-99 range over the past 24 hours.  He continues to have some right flank pain but does feel that it is improving compared to time of admission.  White blood cell count is starting to trend downward but patient continues to have symptoms of dysuria and discomfort.  He has no new symptoms.  No new nursing concerns.    Data reviewed today: I reviewed all medications, new labs and imaging results over the last 24 hours.    Physical Exam   Vital Signs: Temp: 98.3  F (36.8  C) Temp src: Oral BP: 133/76 Pulse: 69 Heart  Rate: 69 Resp: 18 SpO2: 98 % O2 Device: None (Room air)    Weight: 249 lbs 9.6 oz  Constitutional: awake, alert, cooperative, no apparent distress, and appears stated age  Respiratory: No increased work of breathing, good air exchange, clear to auscultation bilaterally, no crackles or wheezing  Cardiovascular: Normal apical impulse, regular rate and rhythm, normal S1 and S2, no S3 or S4, and no murmur noted  GI: Bowel sounds present, abdomen soft but with ongoing tenderness in the suprapubic region but no guarding or rebound tenderness noted.  Skin: no redness, warmth, or swelling and no rashes  Musculoskeletal: no lower extremity pitting edema present  Neurologic: Awake, alert, oriented to name, place and time and overall to situation    Data   Recent Labs   Lab 04/07/20  0921 04/06/20 2207 04/06/20  0639 04/05/20 2007   WBC 16.1*  --  18.2* 15.8*   HGB 13.4  --  13.3 13.4   MCV 85  --  87 86     --  248 273   *  --  133 139   POTASSIUM 3.4 3.5 3.2* 3.4   CHLORIDE 96  --  100 101   CO2 26  --  24 28   BUN 21  --  16 17   CR 0.49*  --  0.45* 0.52*   ANIONGAP 6  --  9 10   CASIMIRO 8.4*  --  8.2* 8.6   *  --  236* 276*   ALBUMIN  --   --   --  2.2*   PROTTOTAL  --   --   --  6.8   BILITOTAL  --   --   --  0.9   ALKPHOS  --   --   --  169*   ALT  --   --   --  10   AST  --   --   --  10

## 2020-04-08 LAB
ANION GAP SERPL CALCULATED.3IONS-SCNC: 5 MMOL/L (ref 3–14)
BUN SERPL-MCNC: 29 MG/DL (ref 7–30)
CALCIUM SERPL-MCNC: 7.9 MG/DL (ref 8.5–10.1)
CHLORIDE SERPL-SCNC: 99 MMOL/L (ref 94–109)
CO2 SERPL-SCNC: 27 MMOL/L (ref 20–32)
CREAT SERPL-MCNC: 0.5 MG/DL (ref 0.66–1.25)
ERYTHROCYTE [DISTWIDTH] IN BLOOD BY AUTOMATED COUNT: 12.8 % (ref 10–15)
GFR SERPL CREATININE-BSD FRML MDRD: >90 ML/MIN/{1.73_M2}
GLUCOSE BLDC GLUCOMTR-MCNC: 121 MG/DL (ref 70–99)
GLUCOSE BLDC GLUCOMTR-MCNC: 127 MG/DL (ref 70–99)
GLUCOSE BLDC GLUCOMTR-MCNC: 137 MG/DL (ref 70–99)
GLUCOSE BLDC GLUCOMTR-MCNC: 159 MG/DL (ref 70–99)
GLUCOSE BLDC GLUCOMTR-MCNC: 165 MG/DL (ref 70–99)
GLUCOSE SERPL-MCNC: 201 MG/DL (ref 70–99)
HCT VFR BLD AUTO: 34 % (ref 40–53)
HGB BLD-MCNC: 11.6 G/DL (ref 13.3–17.7)
MCH RBC QN AUTO: 30.3 PG (ref 26.5–33)
MCHC RBC AUTO-ENTMCNC: 34.1 G/DL (ref 31.5–36.5)
MCV RBC AUTO: 89 FL (ref 78–100)
PLATELET # BLD AUTO: 321 10E9/L (ref 150–450)
POTASSIUM SERPL-SCNC: 3.5 MMOL/L (ref 3.4–5.3)
PROCALCITONIN SERPL-MCNC: 0.59 NG/ML
RBC # BLD AUTO: 3.83 10E12/L (ref 4.4–5.9)
SODIUM SERPL-SCNC: 131 MMOL/L (ref 133–144)
WBC # BLD AUTO: 14 10E9/L (ref 4–11)

## 2020-04-08 PROCEDURE — 85027 COMPLETE CBC AUTOMATED: CPT | Performed by: FAMILY MEDICINE

## 2020-04-08 PROCEDURE — 25000132 ZZH RX MED GY IP 250 OP 250 PS 637: Performed by: HOSPITALIST

## 2020-04-08 PROCEDURE — 12000000 ZZH R&B MED SURG/OB

## 2020-04-08 PROCEDURE — 84145 PROCALCITONIN (PCT): CPT | Performed by: FAMILY MEDICINE

## 2020-04-08 PROCEDURE — 99207 ZZC CDG-MDM COMPONENT: MEETS MODERATE - UP CODED: CPT | Performed by: FAMILY MEDICINE

## 2020-04-08 PROCEDURE — 80048 BASIC METABOLIC PNL TOTAL CA: CPT | Performed by: FAMILY MEDICINE

## 2020-04-08 PROCEDURE — 36415 COLL VENOUS BLD VENIPUNCTURE: CPT | Performed by: FAMILY MEDICINE

## 2020-04-08 PROCEDURE — 25000132 ZZH RX MED GY IP 250 OP 250 PS 637: Performed by: FAMILY MEDICINE

## 2020-04-08 PROCEDURE — 00000146 ZZHCL STATISTIC GLUCOSE BY METER IP

## 2020-04-08 PROCEDURE — 25000132 ZZH RX MED GY IP 250 OP 250 PS 637: Performed by: PEDIATRICS

## 2020-04-08 PROCEDURE — 25000128 H RX IP 250 OP 636: Performed by: HOSPITALIST

## 2020-04-08 PROCEDURE — 99233 SBSQ HOSP IP/OBS HIGH 50: CPT | Performed by: FAMILY MEDICINE

## 2020-04-08 RX ADMIN — METOPROLOL TARTRATE 25 MG: 25 TABLET, FILM COATED ORAL at 20:36

## 2020-04-08 RX ADMIN — NAPROXEN 500 MG: 250 TABLET ORAL at 02:32

## 2020-04-08 RX ADMIN — GLIPIZIDE 10 MG: 5 TABLET ORAL at 16:45

## 2020-04-08 RX ADMIN — FAMOTIDINE 20 MG: 10 TABLET, FILM COATED ORAL at 08:19

## 2020-04-08 RX ADMIN — METFORMIN HYDROCHLORIDE 500 MG: 500 TABLET ORAL at 17:17

## 2020-04-08 RX ADMIN — SERTRALINE HYDROCHLORIDE 50 MG: 50 TABLET ORAL at 08:19

## 2020-04-08 RX ADMIN — INSULIN ASPART 1 UNITS: 100 INJECTION, SOLUTION INTRAVENOUS; SUBCUTANEOUS at 08:20

## 2020-04-08 RX ADMIN — NAPROXEN 500 MG: 250 TABLET ORAL at 14:50

## 2020-04-08 RX ADMIN — OXYCODONE HYDROCHLORIDE 10 MG: 5 TABLET ORAL at 20:36

## 2020-04-08 RX ADMIN — OXYCODONE HYDROCHLORIDE 10 MG: 5 TABLET ORAL at 08:19

## 2020-04-08 RX ADMIN — MEROPENEM 1 G: 1 INJECTION, POWDER, FOR SOLUTION INTRAVENOUS at 06:09

## 2020-04-08 RX ADMIN — POLYETHYLENE GLYCOL 3350 17 G: 17 POWDER, FOR SOLUTION ORAL at 16:45

## 2020-04-08 RX ADMIN — METOPROLOL TARTRATE 25 MG: 25 TABLET, FILM COATED ORAL at 08:19

## 2020-04-08 RX ADMIN — LEVOTHYROXINE SODIUM 75 MCG: 75 TABLET ORAL at 08:19

## 2020-04-08 RX ADMIN — MEROPENEM 1 G: 1 INJECTION, POWDER, FOR SOLUTION INTRAVENOUS at 14:15

## 2020-04-08 RX ADMIN — OXYCODONE HYDROCHLORIDE 10 MG: 5 TABLET ORAL at 12:39

## 2020-04-08 RX ADMIN — GLIPIZIDE 10 MG: 5 TABLET ORAL at 07:42

## 2020-04-08 RX ADMIN — OXYCODONE HYDROCHLORIDE 10 MG: 5 TABLET ORAL at 04:17

## 2020-04-08 RX ADMIN — OXYCODONE HYDROCHLORIDE 10 MG: 5 TABLET ORAL at 16:45

## 2020-04-08 RX ADMIN — METFORMIN HYDROCHLORIDE 500 MG: 500 TABLET ORAL at 07:42

## 2020-04-08 RX ADMIN — MEROPENEM 1 G: 1 INJECTION, POWDER, FOR SOLUTION INTRAVENOUS at 21:25

## 2020-04-08 RX ADMIN — ENALAPRIL MALEATE 10 MG: 2.5 TABLET ORAL at 08:19

## 2020-04-08 RX ADMIN — OXYCODONE HYDROCHLORIDE 10 MG: 5 TABLET ORAL at 00:10

## 2020-04-08 RX ADMIN — NICOTINE 1 PATCH: 21 PATCH TRANSDERMAL at 21:22

## 2020-04-08 RX ADMIN — FAMOTIDINE 20 MG: 10 TABLET, FILM COATED ORAL at 20:36

## 2020-04-08 ASSESSMENT — PAIN DESCRIPTION - DESCRIPTORS: DESCRIPTORS: SHARP

## 2020-04-08 ASSESSMENT — ACTIVITIES OF DAILY LIVING (ADL)
ADLS_ACUITY_SCORE: 12

## 2020-04-08 NOTE — PROGRESS NOTES
Pt received 10mg oxycodone for right flank pain radiating to mid-back.  Pt ambulated in halls with assist of 1 staff, walker and gait belt and reported pain aggravated slightly with ambulation. Did tolerate walking to windows at end of hallway and back to room.  Position changes from sitting to standing or sitting more upright in bed also aggravating pain.  Pt requires bed to be perched in order to get in and out.  Continuing to encourage increased ambulation as tolerated.

## 2020-04-08 NOTE — PROGRESS NOTES
[facility]    Medicine Progress Note - Hospitalist Service       Date of Admission:  4/5/2020  Assessment & Plan     Patient is a 55-year-old gentleman with uncontrolled diabetes, uncontrolled hypertension, and numerous drug sensitivities who presented with emphysematous cystitis with leukocytosis, fever up to 101 in the home environment and clinical picture concerning for SIRS vs sepsis development.  He was been started on IV meropenem and over the past 24 hours does feel as though his symptoms are starting to clinically improve with patient's white blood cell count trend downward and patient been afebrile since time of admission.  His urine culture is growing less than 10,000 mixed urogenital tip.  Did discuss this patient with infectious disease on 4/7/2020 given the severity of his initial illness as well as lack of culture identification and they are recommending ongoing meropenem until at least 4/9/2020 with consideration of transitioning to an oral broad coverage antibiotic such as a flouroquinolone with ongoing close monitoring in the hospital setting for ongoing improvement.  They also recommended consultation with urology to see if any additional treatment is necessary either during this hospitalization or as an outpatient going forward, which is unfortunately not available at this facility.  Per the patient's request, did discuss his clinical situation with Dr. Moncada, urologist on-call for [facility], who reports that as the patient is clinically improving there is no need for inpatient urological evaluation.  She agrees that ciprofloxacin would be an appropriate choice for antibiotic and would recommend higher dosing, such as is used with pyelonephritis, as well as at least 14 days of antibiotics to ensure clearance of the organism Given the emphysematous cystitis presents.  She does feel that outpatient follow-up with urology would be appropriate  given the patient's young age and severe illness for further evaluation of possible BPH versus other etiology that would increase his likelihood of recurrent infection going forward.  These results were discussed with the patient and his wife and they are in agreement of this plan.  We will continue meropenem until tomorrow morning but if patient has ongoing clinical improvement as well as ongoing improvement in lab values, will transition to oral ciprofloxacin tomorrow morning.    Principal Problem:    Emphysematous cystitis    Assessment: Identified on CT scan imaging and patient with right flank pain, dysuria, and abnormal urinalysis with positive nitrates and significant pyuria with patient on IV meropenem but urine culture showing no significant organism growth    Plan: Continue with IV meropenem for the next 24 hours as outlined above with consideration of transitioning to oral ciprofloxacin tomorrow as patient continues to clinically improve    Active Problems:    Flank pain-right    Assessment: present at time of admission - ongoing but progressively improving with treatment as above. No evidence of pyelonephritis on CT scan imaging from time of admission    Plan: We will continue with IV meropenem as above and monitor for ongoing improvement of symptoms.      Severe hypertension    Assessment: Systolic blood pressure was in the 200s systolic and 110s diastolic on initial presentation in patient with known hypertension not recently taking any medication for this.  Work-up is in place for possible hyperaldosteronism as the patient was noted to have hypophosphatemia in addition to having a thickening of the left adrenal gland consistent with a left adrenal adenoma.  Patient has been started on enalapril and oral metoprolol and blood pressures appear to have stabilized in the 110-130 range systolic with patient tolerating both medications well.    Plan: We will continue with enalapril 10 mg daily and metoprolol  25 mg BID and monitor closely for adequate control with further titration as needed      Multiple drug allergies    Assessment: On review of patient's chart, he does not have true drug allergies but rather has side effect intolerances, mostly GI related    Plan: Continue with IV meropenem as per infectious disease recommendation with hopeful transition to PO Cipro tomorrow and will monitor for tolerance       MRSA    Assessment: Previous history of infection in 2008 with negative testing during this stay and patient does not have open skin lesion or incontinence therefore contact isolation has not been maintained during this hospitalization due to current isolation standards during COVID-19 pandemic     Plan: No contact isolation needed going forward      History of laryngeal cancer    Assessment: Previous history without known reoccurrence    Plan: No acute intervention is needed      Generalized anxiety disorder    Assessment: Chronic and stable with patient somewhat consistently taking his home regimen    Plan: Continue sertraline daily      Uncontrolled type 2 diabetes mellitus with hyperglycemia (H)    Assessment: uncontrolled with hemoglobin A1c 8.5 and blood sugars consistently in the 200-300 range during this hospital stay, not on any medication prior to this hospitalization.  He has been started on a diabetic diet as well as glipizide and metformin in addition to ongoing high correction sliding scale insulin with blood sugars trending downward    Plan: Continue with diabetic diet, glipizide 10 mg twice daily, metformin 500 mg twice daily in addition to ongoing a high correction sliding scale insulin and monitor for ongoing blood sugar improvement.      GERD (gastroesophageal reflux disease)    Assessment: Well controlled with Pepcid    Plan: Continue home regimen without change      Hypothyroidism    Assessment: On Synthroid, however unclear if patient has been can taking this consistently - TSH is elevated  but free T4 is normal    Plan: No adjustment in Synthroid as deemed necessary at this time, however may be beneficial to obtain another TSH reflex to free T4 in 6 weeks after consistent use of this medication to see if there is significant change in any dose adjustment is needed at that time      Calculus of gallbladder without cholecystitis without obstruction    Assessment: Incidental finding with patient asymptomatic    Plan: No acute intervention is needed       Adenoma of left adrenal gland-possible per CT findings    Assessment: incidental radiographic finding during this hospitalization    Plan: Continue with work-up for hypoaldosteronism as above      Obesity    Assessment: Chronic and stable    Plan: No acute intervention is needed       Diet: Combination Diet 5218-9997 Calories: Moderate Consistent CHO (4-6 CHO units/meal); Low Saturated Fat Na <2400mg Diet    DVT Prophylaxis: Low Risk/Ambulatory with no VTE prophylaxis indicated  Santos Catheter: not present  Code Status: Full Code      Disposition Plan   Expected discharge: 2 - 3 days, recommended to prior living arrangement once antibiotic plan established, blood pressure less than 140 systolic consistently and safe disposition plan has been identified and found.  Entered: Jasmyne Condon MD 04/08/2020, 8:56 AM       The patient's care was discussed with the Bedside Nurse, Care Coordinator/, Patient and Dr. Moncada, urologist at [facility].    Jasmyne Condon MD  Hospitalist Service  [facility]    ______________________________________________________________________    Interval History   Patient continues to improve.  He has been afebrile for the last 24 hours, blood pressures have significantly improved with the addition of oral metoprolol last evening and blood pressures have been at goal overnight.  Patient reports ongoing progressive improvement in his pain symptoms  without any significant flank pain today.  He does still have some discomfort as his bladder fills prior to voiding but does not notice any dysuria at this time.  White blood cell count and procalcitonin are trending downward.  Patient denies any new symptoms.  No new nursing concerns.    Data reviewed today: I reviewed all medications, new labs and imaging results over the last 24 hours.    Physical Exam   Vital Signs: Temp: 96.2  F (35.7  C) Temp src: Oral BP: 125/68 Pulse: 66   Resp: 20 SpO2: 99 % O2 Device: None (Room air)    Weight: 249 lbs 9.6 oz  Constitutional: awake, alert, cooperative, no apparent distress, and appears stated age  Respiratory: No increased work of breathing, good air exchange, clear to auscultation bilaterally, no crackles or wheezing  Cardiovascular: Normal apical impulse, regular rate and rhythm, normal S1 and S2, no S3 or S4, and no murmur noted  GI: bowel sounds present, abdomen obese but soft, ongoing mild tenderness in suprapubic region, no CVA tenderness on right or left flank today  Skin: no redness, warmth, or swelling and no rashes  Musculoskeletal: no lower extremity pitting edema present  Neurologic: Awake, alert, oriented to name, place and time.     Data   Recent Labs   Lab 04/08/20  0607 04/07/20  1415 04/07/20  0921 04/06/20  2207 04/06/20  0639 04/05/20 2007   WBC 14.0*  --  16.1*  --  18.2* 15.8*   HGB 11.6*  --  13.4  --  13.3 13.4   MCV 89  --  85  --  87 86     --  298  --  248 273   * 132* 128*  --  133 139   POTASSIUM 3.5  --  3.4 3.5 3.2* 3.4   CHLORIDE 99  --  96  --  100 101   CO2 27  --  26  --  24 28   BUN 29  --  21  --  16 17   CR 0.50*  --  0.49*  --  0.45* 0.52*   ANIONGAP 5  --  6  --  9 10   CASIMIRO 7.9*  --  8.4*  --  8.2* 8.6   *  --  300*  --  236* 276*   ALBUMIN  --   --   --   --   --  2.2*   PROTTOTAL  --   --   --   --   --  6.8   BILITOTAL  --   --   --   --   --  0.9   ALKPHOS  --   --   --   --   --  169*   ALT  --   --   --   --    --  10   AST  --   --   --   --   --  10

## 2020-04-08 NOTE — PLAN OF CARE
VSS. Taking Oxy every 4 hours for right flank pain. Using urinal in bed. Adequate UOP. Good appetite. Will continue IV abx x24 hours and then transition to po abx. Continue to monitor.

## 2020-04-08 NOTE — PLAN OF CARE
S: Shift note    B: Emphysematous cystitis    A: A&OX4. VSS on room air. PIV left arm SL between abx. New nicotine patch on left arm under co-band wrap. Pain in right lower flank managed with prn oxycodone, morphine and scheduled naproxine. Transferring with assist of 1 and walker. BG at bedtime was 188, no insulin given. LS clear. CMS and neuros intact. Appetite good. BS active. Urine is tea colored, voiding in urinal.     R: Continue with plan of care

## 2020-04-08 NOTE — PLAN OF CARE
Vss. Afebrile. BP improved, 120s/60s. Pt has pain to R flank and lower back, controlled with prn Oxycodone and Naproxen. BG overnight=165. Strains to void but denies painful urination. Has had adequate UOP. Will continue to monitor vs, labs, pain control, abx as ordered.

## 2020-04-09 LAB
ALDOST SERPL-MCNC: <3 NG/DL (ref 0–31)
ANION GAP SERPL CALCULATED.3IONS-SCNC: 5 MMOL/L (ref 3–14)
BUN SERPL-MCNC: 18 MG/DL (ref 7–30)
CALCIUM SERPL-MCNC: 8.3 MG/DL (ref 8.5–10.1)
CHLORIDE SERPL-SCNC: 97 MMOL/L (ref 94–109)
CO2 SERPL-SCNC: 26 MMOL/L (ref 20–32)
CREAT SERPL-MCNC: 0.49 MG/DL (ref 0.66–1.25)
ERYTHROCYTE [DISTWIDTH] IN BLOOD BY AUTOMATED COUNT: 12.4 % (ref 10–15)
GFR SERPL CREATININE-BSD FRML MDRD: >90 ML/MIN/{1.73_M2}
GLUCOSE BLDC GLUCOMTR-MCNC: 120 MG/DL (ref 70–99)
GLUCOSE BLDC GLUCOMTR-MCNC: 147 MG/DL (ref 70–99)
GLUCOSE BLDC GLUCOMTR-MCNC: 166 MG/DL (ref 70–99)
GLUCOSE BLDC GLUCOMTR-MCNC: 186 MG/DL (ref 70–99)
GLUCOSE BLDC GLUCOMTR-MCNC: 211 MG/DL (ref 70–99)
GLUCOSE SERPL-MCNC: 163 MG/DL (ref 70–99)
HCT VFR BLD AUTO: 37.4 % (ref 40–53)
HGB BLD-MCNC: 13 G/DL (ref 13.3–17.7)
MCH RBC QN AUTO: 30.6 PG (ref 26.5–33)
MCHC RBC AUTO-ENTMCNC: 34.8 G/DL (ref 31.5–36.5)
MCV RBC AUTO: 88 FL (ref 78–100)
OSMOLALITY SERPL: 271 MMOL/KG (ref 275–295)
OSMOLALITY UR: 546 MMOL/KG (ref 100–1200)
PLATELET # BLD AUTO: 404 10E9/L (ref 150–450)
POTASSIUM SERPL-SCNC: 4.2 MMOL/L (ref 3.4–5.3)
PROCALCITONIN SERPL-MCNC: 0.26 NG/ML
RBC # BLD AUTO: 4.25 10E12/L (ref 4.4–5.9)
RENIN PLAS-CCNC: 0.1 NG/ML/HR
SODIUM SERPL-SCNC: 128 MMOL/L (ref 133–144)
SODIUM UR-SCNC: 152 MMOL/L
WBC # BLD AUTO: 14.3 10E9/L (ref 4–11)

## 2020-04-09 PROCEDURE — 36415 COLL VENOUS BLD VENIPUNCTURE: CPT | Performed by: FAMILY MEDICINE

## 2020-04-09 PROCEDURE — 83930 ASSAY OF BLOOD OSMOLALITY: CPT | Performed by: HOSPITALIST

## 2020-04-09 PROCEDURE — 99233 SBSQ HOSP IP/OBS HIGH 50: CPT | Performed by: FAMILY MEDICINE

## 2020-04-09 PROCEDURE — 99207 ZZC CDG-MDM COMPONENT: MEETS MODERATE - UP CODED: CPT | Performed by: FAMILY MEDICINE

## 2020-04-09 PROCEDURE — 84300 ASSAY OF URINE SODIUM: CPT | Performed by: FAMILY MEDICINE

## 2020-04-09 PROCEDURE — 25000132 ZZH RX MED GY IP 250 OP 250 PS 637: Performed by: FAMILY MEDICINE

## 2020-04-09 PROCEDURE — 85027 COMPLETE CBC AUTOMATED: CPT | Performed by: FAMILY MEDICINE

## 2020-04-09 PROCEDURE — 12000000 ZZH R&B MED SURG/OB

## 2020-04-09 PROCEDURE — 25000132 ZZH RX MED GY IP 250 OP 250 PS 637: Performed by: PEDIATRICS

## 2020-04-09 PROCEDURE — 80048 BASIC METABOLIC PNL TOTAL CA: CPT | Performed by: FAMILY MEDICINE

## 2020-04-09 PROCEDURE — 00000146 ZZHCL STATISTIC GLUCOSE BY METER IP

## 2020-04-09 PROCEDURE — 83935 ASSAY OF URINE OSMOLALITY: CPT | Performed by: HOSPITALIST

## 2020-04-09 PROCEDURE — 40000893 ZZH STATISTIC PT IP EVAL DEFER: Performed by: PHYSICAL THERAPIST

## 2020-04-09 PROCEDURE — 25000128 H RX IP 250 OP 636: Performed by: HOSPITALIST

## 2020-04-09 PROCEDURE — 25000132 ZZH RX MED GY IP 250 OP 250 PS 637: Performed by: HOSPITALIST

## 2020-04-09 PROCEDURE — 84145 PROCALCITONIN (PCT): CPT | Performed by: FAMILY MEDICINE

## 2020-04-09 RX ORDER — METOPROLOL TARTRATE 50 MG
50 TABLET ORAL 2 TIMES DAILY
Status: DISCONTINUED | OUTPATIENT
Start: 2020-04-09 | End: 2020-04-11 | Stop reason: HOSPADM

## 2020-04-09 RX ORDER — POLYETHYLENE GLYCOL 3350 17 G/17G
17 POWDER, FOR SOLUTION ORAL DAILY
Status: DISCONTINUED | OUTPATIENT
Start: 2020-04-10 | End: 2020-04-11 | Stop reason: HOSPADM

## 2020-04-09 RX ORDER — CIPROFLOXACIN 500 MG/1
500 TABLET, FILM COATED ORAL EVERY 12 HOURS SCHEDULED
Status: DISCONTINUED | OUTPATIENT
Start: 2020-04-09 | End: 2020-04-11 | Stop reason: HOSPADM

## 2020-04-09 RX ORDER — BISACODYL 10 MG
10 SUPPOSITORY, RECTAL RECTAL ONCE
Status: COMPLETED | OUTPATIENT
Start: 2020-04-09 | End: 2020-04-09

## 2020-04-09 RX ORDER — AMOXICILLIN 250 MG
1 CAPSULE ORAL 2 TIMES DAILY
Status: DISCONTINUED | OUTPATIENT
Start: 2020-04-09 | End: 2020-04-11 | Stop reason: HOSPADM

## 2020-04-09 RX ADMIN — SENNOSIDES AND DOCUSATE SODIUM 1 TABLET: 8.6; 5 TABLET ORAL at 20:41

## 2020-04-09 RX ADMIN — FAMOTIDINE 20 MG: 10 TABLET, FILM COATED ORAL at 20:41

## 2020-04-09 RX ADMIN — GLIPIZIDE 10 MG: 5 TABLET ORAL at 16:09

## 2020-04-09 RX ADMIN — NICOTINE 1 PATCH: 21 PATCH TRANSDERMAL at 22:01

## 2020-04-09 RX ADMIN — OXYCODONE HYDROCHLORIDE 10 MG: 5 TABLET ORAL at 11:14

## 2020-04-09 RX ADMIN — MORPHINE SULFATE 1 MG: 2 INJECTION, SOLUTION INTRAMUSCULAR; INTRAVENOUS at 00:38

## 2020-04-09 RX ADMIN — METFORMIN HYDROCHLORIDE 500 MG: 500 TABLET ORAL at 17:28

## 2020-04-09 RX ADMIN — MEROPENEM 1 G: 1 INJECTION, POWDER, FOR SOLUTION INTRAVENOUS at 14:30

## 2020-04-09 RX ADMIN — ENALAPRIL MALEATE 10 MG: 2.5 TABLET ORAL at 08:03

## 2020-04-09 RX ADMIN — LEVOTHYROXINE SODIUM 75 MCG: 75 TABLET ORAL at 08:04

## 2020-04-09 RX ADMIN — OXYCODONE HYDROCHLORIDE 10 MG: 5 TABLET ORAL at 01:34

## 2020-04-09 RX ADMIN — BISACODYL 10 MG: 10 SUPPOSITORY RECTAL at 10:00

## 2020-04-09 RX ADMIN — NAPROXEN 500 MG: 250 TABLET ORAL at 23:58

## 2020-04-09 RX ADMIN — GLIPIZIDE 10 MG: 5 TABLET ORAL at 06:42

## 2020-04-09 RX ADMIN — SENNOSIDES AND DOCUSATE SODIUM 1 TABLET: 8.6; 5 TABLET ORAL at 09:56

## 2020-04-09 RX ADMIN — HYDRALAZINE HYDROCHLORIDE 10 MG: 20 INJECTION INTRAMUSCULAR; INTRAVENOUS at 02:22

## 2020-04-09 RX ADMIN — OXYCODONE HYDROCHLORIDE 10 MG: 5 TABLET ORAL at 06:42

## 2020-04-09 RX ADMIN — METOPROLOL TARTRATE 25 MG: 25 TABLET, FILM COATED ORAL at 08:05

## 2020-04-09 RX ADMIN — INSULIN ASPART 2 UNITS: 100 INJECTION, SOLUTION INTRAVENOUS; SUBCUTANEOUS at 12:39

## 2020-04-09 RX ADMIN — METOPROLOL TARTRATE 50 MG: 50 TABLET, FILM COATED ORAL at 20:41

## 2020-04-09 RX ADMIN — INSULIN ASPART 2 UNITS: 100 INJECTION, SOLUTION INTRAVENOUS; SUBCUTANEOUS at 08:08

## 2020-04-09 RX ADMIN — POLYETHYLENE GLYCOL 3350 17 G: 17 POWDER, FOR SOLUTION ORAL at 08:06

## 2020-04-09 RX ADMIN — OXYCODONE HYDROCHLORIDE 10 MG: 5 TABLET ORAL at 15:20

## 2020-04-09 RX ADMIN — METFORMIN HYDROCHLORIDE 500 MG: 500 TABLET ORAL at 08:05

## 2020-04-09 RX ADMIN — NAPROXEN 500 MG: 250 TABLET ORAL at 11:35

## 2020-04-09 RX ADMIN — INSULIN ASPART 3 UNITS: 100 INJECTION, SOLUTION INTRAVENOUS; SUBCUTANEOUS at 17:28

## 2020-04-09 RX ADMIN — SERTRALINE HYDROCHLORIDE 50 MG: 50 TABLET ORAL at 08:07

## 2020-04-09 RX ADMIN — MEROPENEM 1 G: 1 INJECTION, POWDER, FOR SOLUTION INTRAVENOUS at 06:42

## 2020-04-09 RX ADMIN — OXYCODONE HYDROCHLORIDE 10 MG: 5 TABLET ORAL at 20:41

## 2020-04-09 RX ADMIN — FAMOTIDINE 20 MG: 10 TABLET, FILM COATED ORAL at 08:04

## 2020-04-09 RX ADMIN — HYDRALAZINE HYDROCHLORIDE 10 MG: 20 INJECTION INTRAMUSCULAR; INTRAVENOUS at 22:18

## 2020-04-09 RX ADMIN — CIPROFLOXACIN HYDROCHLORIDE 500 MG: 500 TABLET, FILM COATED ORAL at 20:41

## 2020-04-09 ASSESSMENT — ACTIVITIES OF DAILY LIVING (ADL)
ADLS_ACUITY_SCORE: 12

## 2020-04-09 NOTE — PROGRESS NOTES
A&Ox4. VSS on room air except elevated BP, prn hydralazine given. Pain in right lower flank that is radiating to RLE, prn morphine and oxycodone given. LS clear. BS active, passing gas. Denies nausea. PIV left arm SL between abx. Nicotine patch on right arm. Up out of bed with assist of 1 and walker. Remained in bed through evening and overnight, using urinal in bed to void.

## 2020-04-09 NOTE — PROGRESS NOTES
CLINICAL NUTRITION SERVICES-Brief Note    Education follow up from initial nutrition assessment for uncontrolled Diabetes screen    NUTRITION HISTORY:  Information obtained from Pt:  -live with wife who does the cooking  -have 5 cook books at home for diabetes  -do not cook with salt or use salt at the table    -has had feeding tube in the past related to cancer and knows importance of fruits and vegetables from when pt was doing some at home blenderizing of foods for tube feed  ?  -wishes to maintain a certain number of carbs per meal upon discharge  ?  Previous diet instructions:  -has had a few educations and some related to malnutrition, tube feed, etc  -diabetes happened after being sick   ?  CURRENT DIET:  Orders Placed This Encounter      Combination Diet 9583-9157 Calories: Moderate Consistent CHO (4-6 CHO units/meal); Low Saturated Fat Na <2400mg Diet      ?  NUTRITION DIAGNOSIS:  Food- and nutrition-related knowledge deficit related to T2DM nutrition therapy-HTN nutrition therapy as evidenced by pt not measuring BS due to lack of equipment, pt not taking medication for hypertension nor diabetes, pt not able to reference     INTERVENTIONS:    Provided handouts: Type 2 Diabetes Mellitus Nutrition Therapy, Diabetes Label Reading, Hypertension Nutrition Therapy    Provided instruction on above handouts, and discussed rational:    -Educated pt on pathophysiology of diabetes and why following a diabetic diet is necessary  -Emphasized the importance of consuming consistent CHOs throughout the day   -Discussed fiber and it's relation with BG control and heart health  -Educated pt on ways to limit sodium in the diet and recommended not using the salt shaker  -Educated pt on healthy fat options- choosing mono/poly unsaturated vs sat & trans   -Discussed pairing a protein w/a complex CHO  -Discussed label reading for sodium as a lot of sodium hides in processed, boxed foods  -Discussed the current plate of food pt  receives in hospital (4-6 carbs per meal) and trying to visually remember and mimic these portions at home.   -Discussed visual portionin/2c generally of carb foods is one carb choice  -Provided contact information for pt to have an outpatient appointment for reinforcement in the future     Goals:    Patient verbalizes understanding of diet by discussing current strategies for diabetes and healthy eating and future goals of maintaining a certain number of carbs per meal     Follow Up:    Patient to ask any further nutrition-related questions before discharge.  In addition, pt may request outpatient RD appointment.     Felisha Johnston MBA, RD LD  Clinical Dietitian  [phone]

## 2020-04-09 NOTE — PLAN OF CARE
Vital signs:  Temp: 97.1  F (36.2  C) Temp src: Oral BP: (!) 147/81 Pulse: 79 Heart Rate: 79 Resp: 18 SpO2: 99 % O2 Device: None (Room air)    A&Ox4. PRN oxycodone given x1 with decrease in right flank and back pain. Pt in bed all of shift, refuses to move or get up as it causes worse pain. Ate 100% of dinner tray. Blood sugar 211 with 3 units given. Voiding adequatley using urinal with 550ml out this shift. Assist of 1 with gait belt and walker. Pt wants to go home tomorrow. Will continue to monitor.

## 2020-04-09 NOTE — PLAN OF CARE
"S-(situation): Physical Therapy evaluation attempted per MD orders    B-(background): Patient is a 55 year old male, admitted from the ED with emphysematous cystitis with right plank pain. Patient with a previous medical history of larynx and esophageal cancer, current smoker, DM type 2, depression, anxiety, obesity, GERD and HTN.    A-(assessment): Patient reports chronic back pain which he has battled for a long time. He admits that is is worsened by immobility however he refuses to move because the pain is bad. Discussed sitting in a straight back chair and he refuses due to low height. Discussed positioning with hips flexed to reduce lumbar tension and he agreed. With transition to knee flexion expressed sharp sudden onset of low back pain (similar to discogenic presentation). Encouraged mobilization out of bed to improve blood flow, reduce muscle tension and improve symptoms however he refused. Attempted to discuss the need for PT mobility assessment for safe discharge and patient again refused stating \"I will leave this place if you make me get out of bed\".     R-(recommendations): Consider further assessment of lumbar spine for discogenic pathology. Apply heat to lumbar spine to decrease pain. Accommodate patient's height with toileting, transferring out of bed and sitting up in chair.     Thank you for your referral.  Rosmery Blake, PT, DPT, ATC    [facility]  O: [phone]  E: [email or web address]        "

## 2020-04-09 NOTE — PLAN OF CARE
S-(situation): Shift note    B-(background): Admitted 4/5 with Emphysematous Cystitis    A-(assessment): Patient continues to have severe lower back pain.  Pain is somewhat better with oxycodone and naproxen.  Assisted to bathroom with walker and gait belt-- had severe pain with ambulation.  Refusing physical therapy.  States he wants to go home so he can lay and rest at home.  Note left for Dr. Condon regarding pain concerns. Had small bowel movement after Dulcolax suppository.    R-(recommendations): Pain control.  Encourage activity.  Prepare for discharge but with possible mobility concerns at home.

## 2020-04-09 NOTE — PROGRESS NOTES
[facility]    Medicine Progress Note - Hospitalist Service       Date of Admission:  4/5/2020  Assessment & Plan     Patient is a 55-year-old gentleman with uncontrolled diabetes, uncontrolled hypertension, and numerous drug sensitivities who presented with emphysematous cystitis with leukocytosis, fever up to 101 in the home environment and clinical picture concerning for SIRS vs sepsis development.  He was been started on IV meropenem with clinical improvement seen with patient's procalcitonin and white blood cell count trend downward and patient been afebrile since time of admission.  His urine culture is growing less than 10,000 mixed urogenital tip.  Did discuss this patient with infectious disease on 4/7/2020 given the severity of his initial illness as well as lack of culture identification and they recommend ongoing meropenem until at least 4/9/2020 (today) with consideration of transitioning to an oral broad coverage antibiotic such as a flouroquinolone with ongoing close monitoring in the hospital setting for ongoing improvement following transition.  They also recommended consultation with urology to see if any additional treatment is necessary either during this hospitalization or as an outpatient going forward, which is unfortunately not available at this facility.  Per the patient's request, did discuss his clinical situation with Dr. Moncada, urologist on-call for [facility], who reports that as the patient is clinically improving there is no need for inpatient urological evaluation.  She agrees that ciprofloxacin would be an appropriate choice for antibiotic and would recommend higher dosing, such as is used with pyelonephritis, as well as at least 14 days of antibiotics to ensure clearance of the organism given the emphysematous cystitis presents.  She does feel that outpatient follow-up with urology would be appropriate given the patient's young  age and severe illness for further evaluation of possible BPH versus other etiology that would increase his likelihood of recurrent infection going forward.  These results were discussed with the patient and his wife and they are in agreement of this plan.  Given his ongoing clinical improvement, patient will transition to ciprofloxacin 500 mg twice daily starting this evening with repeat check of procalcitonin 24 hours after transition to ensure ongoing improvement and close monitoring until the morning of 4/11/2020 at which time patient will hopefully be able to discharge home with outpatient urology follow-up.    Principal Problem:    Emphysematous cystitis    Assessment: Identified on CT scan imaging and patient with right flank pain, dysuria, and abnormal urinalysis with positive nitrates and significant pyuria with patient on IV meropenem but urine culture showing no significant organism growth    Plan: Transition to oral ciprofloxacin 500 mg twice daily and continue to monitor for ongoing clinical improvement    Active Problems:    Flank pain-right    Assessment: present at time of admission - ongoing but progressively improving with treatment as above. No evidence of pyelonephritis on CT scan imaging from time of admission    Plan: Transition to ciprofloxacin as outlined above and monitor closely      Severe hypertension    Assessment: Systolic blood pressure was in the 200s systolic and 110s diastolic on initial presentation in patient with known hypertension not recently taking any medication for this.  Work-up is in place for possible hyperaldosteronism as the patient was noted to have hypophosphatemia in addition to having a thickening of the left adrenal gland consistent with a left adrenal adenoma.  Patient has been started on enalapril and oral metoprolol and blood pressures appear to have improved with patient tolerating both medications well, however blood pressure has been noted to fluctuate  between 120-170s systolic in the past 24 hours.    Plan: We will continue with enalapril 10 mg daily but increase metoprolol to 50 mg BID and monitor closely for adequate control with further titration as needed      Uncontrolled type 2 diabetes mellitus with hyperglycemia (H)    Assessment: uncontrolled with hemoglobin A1c 8.5 and blood sugars consistently in the 200-300 range during this hospital stay, not on any medication prior to this hospitalization.  He has been started on a diabetic diet as well as glipizide and metformin in addition to ongoing high correction sliding scale insulin with blood sugars trending downward into the 100 to low 200 range.    Plan: Continue with diabetic diet, glipizide 10 mg twice daily, metformin 500 mg twice daily in addition to ongoing a high correction sliding scale insulin and monitor for ongoing blood sugar improvement.      Constipation    Assessment: Patient reports struggling with his at home but it is currently worsened secondary to his narcotic use for his pain as above    Plan: We will start MiraLAX daily, transition Senokot to be daily as well. Patient is requesting a suppository and states this is sometimes only thing that will help him in the home environment so we will perform this x1.  We will also start milk of magnesia as needed going forward      Multiple drug allergies    Assessment: On review of patient's chart, he does not have true drug allergies but rather has side effect intolerances, mostly GI related    Plan: Transition to oral ciprofloxacin today and monitor closely for tolerance      MRSA    Assessment: Previous history of infection in 2008 with negative testing during this stay and patient does not have open skin lesion or incontinence therefore contact isolation has not been maintained during this hospitalization due to current isolation standards during COVID-19 pandemic     Plan: No contact isolation needed going forward      History of laryngeal  cancer    Assessment: Previous history without known reoccurrence    Plan: No acute intervention is needed      Generalized anxiety disorder    Assessment: Chronic and stable with patient somewhat consistently taking his home regimen    Plan: Continue sertraline daily      GERD (gastroesophageal reflux disease)    Assessment: Well controlled with Pepcid    Plan: Continue home regimen without change      Hypothyroidism    Assessment: On Synthroid, however unclear if patient has been can taking this consistently - TSH is elevated but free T4 is normal    Plan: No adjustment in Synthroid as deemed necessary at this time, however may be beneficial to obtain another TSH reflex to free T4 in 6 weeks after consistent use of this medication to see if there is significant change in any dose adjustment is needed at that time      Calculus of gallbladder without cholecystitis without obstruction    Assessment: Incidental finding with patient asymptomatic    Plan: No acute intervention is needed       Adenoma of left adrenal gland-possible per CT findings    Assessment: incidental radiographic finding during this hospitalization    Plan: Continue with work-up for hypoaldosteronism as above      Obesity    Assessment: Chronic and stable    Plan: No acute intervention is needed     Diet: Combination Diet 8899-3601 Calories: Moderate Consistent CHO (4-6 CHO units/meal); Low Saturated Fat Na <2400mg Diet    DVT Prophylaxis: Low Risk/Ambulatory with no VTE prophylaxis indicated  Santos Catheter: not present  Code Status: Full Code      Disposition Plan   Expected discharge: 2 days, recommended to prior living arrangement once Transition to oral antibiotics goes well, blood pressure control continues to improve, blood sugar control continues to improve.  Entered: Jasmyne Condon MD 04/09/2020, 3:54 PM       The patient's care was discussed with the Bedside Nurse, Care Coordinator/ and Patient.    Jasmyne  Carmen Condon MD  Hospitalist Service  [facility]    ______________________________________________________________________    Interval History   Patient did have increased blood pressure last evening when he had acute worsening of his chronic lower back pain after sitting in the recliner wrong.  His other vitals have been stable and he has been afebrile.  Patient denies any new symptoms.  No new nursing concerns    Data reviewed today: I reviewed all medications, new labs and imaging results over the last 24 hours.    Physical Exam   Vital Signs: Temp: 98.5  F (36.9  C) Temp src: Oral BP: (!) 176/85 Pulse: 67 Heart Rate: 91 Resp: 20 SpO2: 96 % O2 Device: None (Room air)    Weight: 249 lbs 9.6 oz  Constitutional: awake, alert, cooperative, no apparent distress, and appears stated age  Respiratory: No increased work of breathing, good air exchange, clear to auscultation bilaterally, no crackles or wheezing  Cardiovascular: Normal apical impulse, regular rate and rhythm, normal S1 and S2  GI: Bowel sounds present, abdomen is soft and nondistended.  Patient does have very mild tenderness on suprapubic region but is notably improved from yesterday  Skin: no redness, warmth, or swelling and no rashes  Musculoskeletal: no lower extremity pitting edema present  Neurologic: Awake, alert, oriented to name, place and time.     Data   Recent Labs   Lab 04/09/20  0543 04/08/20  0607 04/07/20  1415 04/07/20  0921  04/05/20 2007   WBC 14.3* 14.0*  --  16.1*   < > 15.8*   HGB 13.0* 11.6*  --  13.4   < > 13.4   MCV 88 89  --  85   < > 86    321  --  298   < > 273   * 131* 132* 128*   < > 139   POTASSIUM 4.2 3.5  --  3.4   < > 3.4   CHLORIDE 97 99  --  96   < > 101   CO2 26 27  --  26   < > 28   BUN 18 29  --  21   < > 17   CR 0.49* 0.50*  --  0.49*   < > 0.52*   ANIONGAP 5 5  --  6   < > 10   CASIMIRO 8.3* 7.9*  --  8.4*   < > 8.6   * 201*  --  300*   < > 276*   ALBUMIN  --   --    --   --   --  2.2*   PROTTOTAL  --   --   --   --   --  6.8   BILITOTAL  --   --   --   --   --  0.9   ALKPHOS  --   --   --   --   --  169*   ALT  --   --   --   --   --  10   AST  --   --   --   --   --  10    < > = values in this interval not displayed.

## 2020-04-10 LAB
ALDOSTERONE RENIN RATIO: NORMAL (ref 0–25)
ANION GAP SERPL CALCULATED.3IONS-SCNC: 4 MMOL/L (ref 3–14)
BUN SERPL-MCNC: 17 MG/DL (ref 7–30)
CALCIUM SERPL-MCNC: 8.6 MG/DL (ref 8.5–10.1)
CHLORIDE SERPL-SCNC: 96 MMOL/L (ref 94–109)
CO2 SERPL-SCNC: 26 MMOL/L (ref 20–32)
CREAT SERPL-MCNC: 0.48 MG/DL (ref 0.66–1.25)
ERYTHROCYTE [DISTWIDTH] IN BLOOD BY AUTOMATED COUNT: 12.4 % (ref 10–15)
GFR SERPL CREATININE-BSD FRML MDRD: >90 ML/MIN/{1.73_M2}
GLUCOSE BLDC GLUCOMTR-MCNC: 100 MG/DL (ref 70–99)
GLUCOSE BLDC GLUCOMTR-MCNC: 107 MG/DL (ref 70–99)
GLUCOSE BLDC GLUCOMTR-MCNC: 118 MG/DL (ref 70–99)
GLUCOSE BLDC GLUCOMTR-MCNC: 149 MG/DL (ref 70–99)
GLUCOSE BLDC GLUCOMTR-MCNC: 178 MG/DL (ref 70–99)
GLUCOSE SERPL-MCNC: 151 MG/DL (ref 70–99)
HCT VFR BLD AUTO: 39.2 % (ref 40–53)
HGB BLD-MCNC: 13.3 G/DL (ref 13.3–17.7)
MCH RBC QN AUTO: 30.4 PG (ref 26.5–33)
MCHC RBC AUTO-ENTMCNC: 33.9 G/DL (ref 31.5–36.5)
MCV RBC AUTO: 90 FL (ref 78–100)
OSMOLALITY UR: 542 MMOL/KG (ref 100–1200)
PLATELET # BLD AUTO: 414 10E9/L (ref 150–450)
POTASSIUM SERPL-SCNC: 4.5 MMOL/L (ref 3.4–5.3)
PROCALCITONIN SERPL-MCNC: 0.22 NG/ML
RBC # BLD AUTO: 4.38 10E12/L (ref 4.4–5.9)
SODIUM SERPL-SCNC: 126 MMOL/L (ref 133–144)
WBC # BLD AUTO: 15.1 10E9/L (ref 4–11)

## 2020-04-10 PROCEDURE — 84145 PROCALCITONIN (PCT): CPT | Performed by: FAMILY MEDICINE

## 2020-04-10 PROCEDURE — 25000131 ZZH RX MED GY IP 250 OP 636 PS 637: Performed by: PEDIATRICS

## 2020-04-10 PROCEDURE — 36415 COLL VENOUS BLD VENIPUNCTURE: CPT | Performed by: FAMILY MEDICINE

## 2020-04-10 PROCEDURE — 99233 SBSQ HOSP IP/OBS HIGH 50: CPT | Performed by: FAMILY MEDICINE

## 2020-04-10 PROCEDURE — 25000132 ZZH RX MED GY IP 250 OP 250 PS 637: Performed by: HOSPITALIST

## 2020-04-10 PROCEDURE — 12000000 ZZH R&B MED SURG/OB

## 2020-04-10 PROCEDURE — 80048 BASIC METABOLIC PNL TOTAL CA: CPT | Performed by: FAMILY MEDICINE

## 2020-04-10 PROCEDURE — 99207 ZZC CDG-MDM COMPONENT: MEETS MODERATE - UP CODED: CPT | Performed by: FAMILY MEDICINE

## 2020-04-10 PROCEDURE — 85027 COMPLETE CBC AUTOMATED: CPT | Performed by: FAMILY MEDICINE

## 2020-04-10 PROCEDURE — 25000132 ZZH RX MED GY IP 250 OP 250 PS 637: Performed by: PEDIATRICS

## 2020-04-10 PROCEDURE — 00000146 ZZHCL STATISTIC GLUCOSE BY METER IP

## 2020-04-10 PROCEDURE — 25000132 ZZH RX MED GY IP 250 OP 250 PS 637: Performed by: FAMILY MEDICINE

## 2020-04-10 RX ORDER — AMOXICILLIN 250 MG
1 CAPSULE ORAL 2 TIMES DAILY
Qty: 60 TABLET | Refills: 0 | Status: SHIPPED | OUTPATIENT
Start: 2020-04-10

## 2020-04-10 RX ORDER — CIPROFLOXACIN 500 MG/1
500 TABLET, FILM COATED ORAL EVERY 12 HOURS
Qty: 16 TABLET | Refills: 0 | Status: SHIPPED | OUTPATIENT
Start: 2020-04-11 | End: 2020-04-22

## 2020-04-10 RX ORDER — FAMOTIDINE 20 MG/1
20 TABLET, FILM COATED ORAL 2 TIMES DAILY
Qty: 60 TABLET | Refills: 0 | Status: SHIPPED | OUTPATIENT
Start: 2020-04-10

## 2020-04-10 RX ORDER — LEVOTHYROXINE SODIUM 75 UG/1
75 TABLET ORAL DAILY
Qty: 30 TABLET | Refills: 0 | Status: SHIPPED | OUTPATIENT
Start: 2020-04-11 | End: 2020-07-08

## 2020-04-10 RX ORDER — NAPROXEN 500 MG/1
500 TABLET ORAL 2 TIMES DAILY PRN
Qty: 60 TABLET | Refills: 0 | Status: SHIPPED | OUTPATIENT
Start: 2020-04-10

## 2020-04-10 RX ORDER — GLUCOSAMINE HCL/CHONDROITIN SU 500-400 MG
CAPSULE ORAL
Qty: 100 EACH | Refills: 0 | Status: SHIPPED | OUTPATIENT
Start: 2020-04-10 | End: 2020-07-08

## 2020-04-10 RX ORDER — METOPROLOL TARTRATE 50 MG
50 TABLET ORAL 2 TIMES DAILY
Qty: 60 TABLET | Refills: 0 | Status: SHIPPED | OUTPATIENT
Start: 2020-04-10 | End: 2020-07-08

## 2020-04-10 RX ORDER — POLYETHYLENE GLYCOL 3350 17 G/17G
17 POWDER, FOR SOLUTION ORAL DAILY
Qty: 30 PACKET | Refills: 0 | Status: SHIPPED | OUTPATIENT
Start: 2020-04-11

## 2020-04-10 RX ORDER — LANCETS
EACH MISCELLANEOUS
Qty: 100 EACH | Refills: 0 | Status: SHIPPED | OUTPATIENT
Start: 2020-04-10 | End: 2020-07-08

## 2020-04-10 RX ORDER — OXYCODONE HYDROCHLORIDE 5 MG/1
5-10 TABLET ORAL EVERY 4 HOURS PRN
Qty: 35 TABLET | Refills: 0 | Status: SHIPPED | OUTPATIENT
Start: 2020-04-10 | End: 2020-04-16

## 2020-04-10 RX ORDER — GLIPIZIDE 10 MG/1
10 TABLET ORAL
Qty: 60 TABLET | Refills: 0 | Status: SHIPPED | OUTPATIENT
Start: 2020-04-11 | End: 2020-07-08

## 2020-04-10 RX ORDER — ENALAPRIL MALEATE 10 MG/1
10 TABLET ORAL DAILY
Qty: 30 TABLET | Refills: 0 | Status: SHIPPED | OUTPATIENT
Start: 2020-04-11

## 2020-04-10 RX ADMIN — METFORMIN HYDROCHLORIDE 500 MG: 500 TABLET ORAL at 07:57

## 2020-04-10 RX ADMIN — METOPROLOL TARTRATE 50 MG: 50 TABLET, FILM COATED ORAL at 09:10

## 2020-04-10 RX ADMIN — FAMOTIDINE 20 MG: 10 TABLET, FILM COATED ORAL at 20:16

## 2020-04-10 RX ADMIN — SERTRALINE HYDROCHLORIDE 50 MG: 50 TABLET ORAL at 09:10

## 2020-04-10 RX ADMIN — CIPROFLOXACIN HYDROCHLORIDE 500 MG: 500 TABLET, FILM COATED ORAL at 20:16

## 2020-04-10 RX ADMIN — ENALAPRIL MALEATE 10 MG: 2.5 TABLET ORAL at 09:11

## 2020-04-10 RX ADMIN — OXYCODONE HYDROCHLORIDE 10 MG: 5 TABLET ORAL at 11:07

## 2020-04-10 RX ADMIN — GLIPIZIDE 10 MG: 5 TABLET ORAL at 16:04

## 2020-04-10 RX ADMIN — METOPROLOL TARTRATE 50 MG: 50 TABLET, FILM COATED ORAL at 20:16

## 2020-04-10 RX ADMIN — GLIPIZIDE 10 MG: 5 TABLET ORAL at 07:57

## 2020-04-10 RX ADMIN — NAPROXEN 500 MG: 250 TABLET ORAL at 12:02

## 2020-04-10 RX ADMIN — FAMOTIDINE 20 MG: 10 TABLET, FILM COATED ORAL at 09:10

## 2020-04-10 RX ADMIN — OXYCODONE HYDROCHLORIDE 10 MG: 5 TABLET ORAL at 05:31

## 2020-04-10 RX ADMIN — SENNOSIDES AND DOCUSATE SODIUM 1 TABLET: 8.6; 5 TABLET ORAL at 20:16

## 2020-04-10 RX ADMIN — CIPROFLOXACIN HYDROCHLORIDE 500 MG: 500 TABLET, FILM COATED ORAL at 07:57

## 2020-04-10 RX ADMIN — METFORMIN HYDROCHLORIDE 500 MG: 500 TABLET ORAL at 17:37

## 2020-04-10 RX ADMIN — OXYCODONE HYDROCHLORIDE 10 MG: 5 TABLET ORAL at 15:13

## 2020-04-10 RX ADMIN — POLYETHYLENE GLYCOL 3350 17 G: 17 POWDER, FOR SOLUTION ORAL at 09:10

## 2020-04-10 RX ADMIN — NICOTINE 1 PATCH: 21 PATCH TRANSDERMAL at 22:05

## 2020-04-10 RX ADMIN — SENNOSIDES AND DOCUSATE SODIUM 1 TABLET: 8.6; 5 TABLET ORAL at 09:10

## 2020-04-10 RX ADMIN — OXYCODONE HYDROCHLORIDE 10 MG: 5 TABLET ORAL at 01:06

## 2020-04-10 RX ADMIN — OXYCODONE HYDROCHLORIDE 10 MG: 5 TABLET ORAL at 20:15

## 2020-04-10 RX ADMIN — LEVOTHYROXINE SODIUM 75 MCG: 75 TABLET ORAL at 09:10

## 2020-04-10 RX ADMIN — INSULIN ASPART 2 UNITS: 100 INJECTION, SOLUTION INTRAVENOUS; SUBCUTANEOUS at 09:12

## 2020-04-10 ASSESSMENT — ACTIVITIES OF DAILY LIVING (ADL)
ADLS_ACUITY_SCORE: 12
ADLS_ACUITY_SCORE: 12
ADLS_ACUITY_SCORE: 14
ADLS_ACUITY_SCORE: 12

## 2020-04-10 NOTE — PLAN OF CARE
Vss, did not require any prn BP medications overnight, and is afebrile. Pt has pain to R flank and lower back, controlled with prn Oxycodone and Naproxen. WA=315 overnight. Urine output in large amounts, clear yellow in color, see I and O. Will continue with plan of care, pain control, I and O, oral abx.

## 2020-04-10 NOTE — PROGRESS NOTES
This evening pt moaning in bed c/o flank pain and back pain requesting pain meds to nursing staff who overheard his moaning from the hallway.  When writer returned to room 10 minutes later, pt was sounds asleep, rousing to vigorous stimulation.  Upon awakening, pt alert and oriented x4.  Temp 100.1 /91.  Prn oxycodone given as well as scheduled metoprolol.  Encouraged coughing and deep breathing.  Pain decreased with analgesia administration.  BP recheck 164/83, Temp 100.6.  Prn hydralazine given for SBP > 160.  Will continue to monitor.

## 2020-04-10 NOTE — PLAN OF CARE
VSS. Taking Oxy and Naproxen for back and left flank pain. Ambulated in tellez x1 this shift. LS clear. PO abx. Adequate UOP and oral intake. Continue to monitor. Plan to discharge 4/11 am.

## 2020-04-10 NOTE — PROGRESS NOTES
[facility]    Medicine Progress Note - Hospitalist Service       Date of Admission:  4/5/2020  Assessment & Plan     Patient is a 55-year-old gentleman with uncontrolled diabetes, uncontrolled hypertension, and numerous drug sensitivities who presented with emphysematous cystitis with leukocytosis, fever up to 101 in the home environment and clinical picture concerning for SIRS vs sepsis development.  He was been started on IV meropenem with clinical improvement seen with patient's procalcitonin and white blood cell count trend downward and patient been afebrile since time of admission.  His urine culture is growing less than 10,000 mixed urogenital tip.  Did discuss this patient with infectious disease on 4/7/2020 given the severity of his initial illness as well as lack of culture identification and they recommend ongoing meropenem until 4/9/2020 with consideration of transitioning to an oral broad coverage antibiotic such as a flouroquinolone with ongoing close monitoring in the hospital setting for ongoing improvement following transition.  They also recommended consultation with urology to see if any additional treatment is necessary either during this hospitalization or as an outpatient going forward.  Discuss his clinical situation with Dr. Moncada, urologist on-call for [facility], who reports that as the patient is clinically improving there is no need for inpatient urological evaluation.  She agrees that ciprofloxacin would be an appropriate choice for antibiotic and would recommend higher dosing, such as is used with pyelonephritis, as well as at least 14 days of antibiotics to ensure clearance of the organism given the emphysematous cystitis present.  She does feel that outpatient follow-up with urology would be appropriate given the patient's young age and severe illness for further evaluation of possible BPH versus other etiology that would increase  his likelihood of recurrent infection going forward.  Patient was continued on Merrem until 4/9/2020 as recommended with procalcitonin trending downward, patient afebrile and pain improving with good urinary output and he transitioned last evening to ciprofloxacin 500 mg twice daily.  WBC is slightly higher today - repeat check of procalcitonin drawn this evening to ensure ongoing improvement.  Anticipate discharge home tomorrow as long as patient continues to improve and infection continues to resolve on PO antibiotics.      Principal Problem:    Emphysematous cystitis    Assessment: Identified on CT scan imaging and patient with right flank pain, dysuria, and abnormal urinalysis with positive nitrates and significant pyuria with patient on IV meropenem but urine culture showing no significant organism growth    Plan: Continue ciprofloxacin 500 mg twice daily and continue to monitor for ongoing clinical improvement    Active Problems:    Flank pain-right    Assessment: present at time of admission - ongoing but progressively improving with treatment as above. No evidence of pyelonephritis on CT scan imaging from time of admission    Plan: Continue with ciprofloxacin as outlined above and monitor closely      Severe hypertension    Assessment: Systolic blood pressure was in the 200s systolic and 110s diastolic on initial presentation in patient with known hypertension not recently taking any medication for this.  Work-up is in place for possible hyperaldosteronism as the patient was noted to have hypophosphatemia in addition to having a thickening of the left adrenal gland consistent with a left adrenal adenoma.  Patient has been started on enalapril and oral metoprolol and blood pressures appear to have improved greatly and are within goal throughout the day today    Plan: We will continue with enalapril 10 mg daily and metoprolol to 50 mg BID and monitor closely for adequate control with further titration as  needed.  Continue to monitor for hyperaldosteronism work up completion.        Uncontrolled type 2 diabetes mellitus with hyperglycemia (H)    Assessment: uncontrolled with hemoglobin A1c 8.5 and blood sugars consistently in the 200-300 range during this hospital stay, not on any medication prior to this hospitalization.  He has been started on a diabetic diet as well as glipizide and metformin in addition to ongoing high correction sliding scale insulin with blood sugars trending downward into the 100 to low 200 range.    Plan: Continue with diabetic diet, glipizide 10 mg twice daily, metformin 500 mg twice daily in addition to ongoing a high correction sliding scale insulin and monitor for ongoing blood sugar improvement.      Hyponatremia    Assessment:  Mild and present consistently during this hospitalization - patient is asymptomatic and sodium has been stable in the 126-133 range.  At first thought possibly secondary to hyperglycemia however has remained low as blood sugars have been corrected.  Possibly secondary to elevated TSH of 25.63, however free T4 is normal.  Patient does at times drink large quantities of fluids and has a difficult time when being instructed to slow down or try to restrict fluids which may contribute.  Urine osmolality was elevated at 542 and urine sodium was elevated at 152 which would indicate hyperthyroidism, possible adrenal insufficiency or SIADH.      Plan:  Would recommend ongoing monitoring during this hospitalization as well as afterward.  Although unlikely, will check triglycerides tomorrow to assess for severe elevation which may be contributing.  If hyponatremia continues to be persistent after discharge, could consider ACTH stimulation test to further evaluate for adrenal insufficiency vs SIADH.        Constipation    Assessment: Patient reports struggling with his at home but it is currently worsened secondary to his narcotic use for his pain as above.  Currently on  MiraLAX daily and Senokot twice daily with as needed milk of magnesia if recurrent constipation occurs.    Plan: Continue with MiraLAX and Senokot daily as well as milk of magnesia as needed      Multiple drug allergies    Assessment: On review of patient's chart, he does not have true drug allergies but rather has side effect intolerances, mostly GI related    Plan: As appears to be tolerating ciprofloxacin without any GI side effects.  We will continue to monitor      MRSA    Assessment: Previous history of infection in 2008 with negative testing during this stay and patient does not have open skin lesion or incontinence therefore contact isolation has not been maintained during this hospitalization due to current isolation standards during COVID-19 pandemic     Plan: No contact isolation needed during this hospital stay      History of laryngeal cancer    Assessment: Previous history without known reoccurrence    Plan: No acute intervention is needed      Generalized anxiety disorder    Assessment: Chronic and stable with patient somewhat consistently taking his home regimen    Plan: Continue sertraline daily      GERD (gastroesophageal reflux disease)    Assessment: Well controlled with Pepcid    Plan: Continue home regimen without change      Hypothyroidism    Assessment: On Synthroid, however unclear if patient has been can taking this consistently - TSH is elevated but free T4 is normal    Plan: No adjustment in Synthroid as deemed necessary at this time, however may be beneficial to obtain another TSH reflex to free T4 in 6 weeks after consistent use of this medication to see if there is significant change in any dose adjustment is needed at that time      Calculus of gallbladder without cholecystitis without obstruction    Assessment: Incidental finding with patient asymptomatic    Plan: No acute intervention is needed       Adenoma of left adrenal gland-possible per CT findings    Assessment: incidental  radiographic finding during this hospitalization    Plan: Continue with work-up for hypoaldosteronism as above      Obesity    Assessment: Chronic and stable    Plan: No acute intervention is needed     Diet: Combination Diet 7465-2848 Calories: Moderate Consistent CHO (4-6 CHO units/meal); Low Saturated Fat Na <2400mg Diet    DVT Prophylaxis: Low Risk/Ambulatory with no VTE prophylaxis indicated  Santos Catheter: not present  Code Status: Full Code      Disposition Plan   Expected discharge: Tomorrow, recommended to prior living arrangement once Patient continues to improve on oral antibiotics.  Entered: Jasmyne Condon MD 04/10/2020, 6:19 PM       The patient's care was discussed with the Bedside Nurse, Care Coordinator/ and Patient.    Jasmyne Condon MD  Hospitalist Service  [facility]    ______________________________________________________________________    Interval History   Patient was hypertensive last evening and was given the higher dose of metoprolol and blood pressures since midnight have been within goal range.  Blood sugars also continue to improve and have been in the 100 range during the day today.  Patient reports his pain continues to slowly improve.  He denies any new symptoms or concerns.  No new nursing concerns    Data reviewed today: I reviewed all medications, new labs and imaging results over the last 24 hours.    Physical Exam   Vital Signs: Temp: 98.4  F (36.9  C) Temp src: Oral BP: 128/72 Pulse: 73 Heart Rate: 71 Resp: 18 SpO2: 97 % O2 Device: None (Room air)    Weight: 249 lbs 9.6 oz  Constitutional: awake, alert, cooperative, no apparent distress, and appears stated age  Respiratory: No increased work of breathing, good air exchange, clear to auscultation bilaterally, no crackles or wheezing  Cardiovascular: Normal apical impulse, regular rate and rhythm, normal S1 and S2, no S3 or S4, and no murmur noted  GI: Bowel  sounds present, abdomen soft and nontender  Skin: no redness, warmth, or swelling and no rashes  Musculoskeletal: no lower extremity pitting edema present  Neurologic: Awake, alert, oriented to name, place and time.    Data   Recent Labs   Lab 04/10/20  0535 04/09/20  0543 04/08/20  0607  04/05/20 2007   WBC 15.1* 14.3* 14.0*   < > 15.8*   HGB 13.3 13.0* 11.6*   < > 13.4   MCV 90 88 89   < > 86    404 321   < > 273   * 128* 131*   < > 139   POTASSIUM 4.5 4.2 3.5   < > 3.4   CHLORIDE 96 97 99   < > 101   CO2 26 26 27   < > 28   BUN 17 18 29   < > 17   CR 0.48* 0.49* 0.50*   < > 0.52*   ANIONGAP 4 5 5   < > 10   CASIMIRO 8.6 8.3* 7.9*   < > 8.6   * 163* 201*   < > 276*   ALBUMIN  --   --   --   --  2.2*   PROTTOTAL  --   --   --   --  6.8   BILITOTAL  --   --   --   --  0.9   ALKPHOS  --   --   --   --  169*   ALT  --   --   --   --  10   AST  --   --   --   --  10    < > = values in this interval not displayed.

## 2020-04-11 VITALS
TEMPERATURE: 97.3 F | SYSTOLIC BLOOD PRESSURE: 122 MMHG | OXYGEN SATURATION: 98 % | RESPIRATION RATE: 18 BRPM | WEIGHT: 249.6 LBS | HEIGHT: 75 IN | DIASTOLIC BLOOD PRESSURE: 71 MMHG | BODY MASS INDEX: 31.04 KG/M2 | HEART RATE: 74 BPM

## 2020-04-11 LAB
ANION GAP SERPL CALCULATED.3IONS-SCNC: 5 MMOL/L (ref 3–14)
BUN SERPL-MCNC: 19 MG/DL (ref 7–30)
CALCIUM SERPL-MCNC: 8.6 MG/DL (ref 8.5–10.1)
CHLORIDE SERPL-SCNC: 98 MMOL/L (ref 94–109)
CHOLEST SERPL-MCNC: 119 MG/DL
CO2 SERPL-SCNC: 27 MMOL/L (ref 20–32)
CREAT SERPL-MCNC: 0.57 MG/DL (ref 0.66–1.25)
ERYTHROCYTE [DISTWIDTH] IN BLOOD BY AUTOMATED COUNT: 12.5 % (ref 10–15)
GFR SERPL CREATININE-BSD FRML MDRD: >90 ML/MIN/{1.73_M2}
GLUCOSE BLDC GLUCOMTR-MCNC: 107 MG/DL (ref 70–99)
GLUCOSE BLDC GLUCOMTR-MCNC: 78 MG/DL (ref 70–99)
GLUCOSE SERPL-MCNC: 121 MG/DL (ref 70–99)
HCT VFR BLD AUTO: 36.2 % (ref 40–53)
HDLC SERPL-MCNC: 22 MG/DL
HGB BLD-MCNC: 12.5 G/DL (ref 13.3–17.7)
LDLC SERPL CALC-MCNC: 74 MG/DL
MCH RBC QN AUTO: 30.6 PG (ref 26.5–33)
MCHC RBC AUTO-ENTMCNC: 34.5 G/DL (ref 31.5–36.5)
MCV RBC AUTO: 89 FL (ref 78–100)
NONHDLC SERPL-MCNC: 97 MG/DL
PLATELET # BLD AUTO: 456 10E9/L (ref 150–450)
POTASSIUM SERPL-SCNC: 4.6 MMOL/L (ref 3.4–5.3)
PROCALCITONIN SERPL-MCNC: 0.2 NG/ML
RBC # BLD AUTO: 4.09 10E12/L (ref 4.4–5.9)
SODIUM SERPL-SCNC: 130 MMOL/L (ref 133–144)
TRIGL SERPL-MCNC: 113 MG/DL
WBC # BLD AUTO: 12.8 10E9/L (ref 4–11)

## 2020-04-11 PROCEDURE — 25000132 ZZH RX MED GY IP 250 OP 250 PS 637: Performed by: HOSPITALIST

## 2020-04-11 PROCEDURE — 80048 BASIC METABOLIC PNL TOTAL CA: CPT | Performed by: FAMILY MEDICINE

## 2020-04-11 PROCEDURE — 25000132 ZZH RX MED GY IP 250 OP 250 PS 637: Performed by: PEDIATRICS

## 2020-04-11 PROCEDURE — 25000132 ZZH RX MED GY IP 250 OP 250 PS 637: Performed by: FAMILY MEDICINE

## 2020-04-11 PROCEDURE — 84145 PROCALCITONIN (PCT): CPT | Performed by: FAMILY MEDICINE

## 2020-04-11 PROCEDURE — 00000146 ZZHCL STATISTIC GLUCOSE BY METER IP

## 2020-04-11 PROCEDURE — 99239 HOSP IP/OBS DSCHRG MGMT >30: CPT | Performed by: FAMILY MEDICINE

## 2020-04-11 PROCEDURE — 36415 COLL VENOUS BLD VENIPUNCTURE: CPT | Performed by: FAMILY MEDICINE

## 2020-04-11 PROCEDURE — 80061 LIPID PANEL: CPT | Performed by: FAMILY MEDICINE

## 2020-04-11 PROCEDURE — 85027 COMPLETE CBC AUTOMATED: CPT | Performed by: FAMILY MEDICINE

## 2020-04-11 RX ADMIN — POLYETHYLENE GLYCOL 3350 17 G: 17 POWDER, FOR SOLUTION ORAL at 09:05

## 2020-04-11 RX ADMIN — CIPROFLOXACIN HYDROCHLORIDE 500 MG: 500 TABLET, FILM COATED ORAL at 09:01

## 2020-04-11 RX ADMIN — SERTRALINE HYDROCHLORIDE 50 MG: 50 TABLET ORAL at 09:01

## 2020-04-11 RX ADMIN — METOPROLOL TARTRATE 50 MG: 50 TABLET, FILM COATED ORAL at 09:01

## 2020-04-11 RX ADMIN — LEVOTHYROXINE SODIUM 75 MCG: 75 TABLET ORAL at 09:01

## 2020-04-11 RX ADMIN — OXYCODONE HYDROCHLORIDE 10 MG: 5 TABLET ORAL at 09:04

## 2020-04-11 RX ADMIN — METFORMIN HYDROCHLORIDE 500 MG: 500 TABLET ORAL at 09:01

## 2020-04-11 RX ADMIN — GLIPIZIDE 10 MG: 5 TABLET ORAL at 06:34

## 2020-04-11 RX ADMIN — OXYCODONE HYDROCHLORIDE 10 MG: 5 TABLET ORAL at 04:18

## 2020-04-11 RX ADMIN — ENALAPRIL MALEATE 10 MG: 2.5 TABLET ORAL at 09:01

## 2020-04-11 RX ADMIN — NAPROXEN 500 MG: 250 TABLET ORAL at 01:12

## 2020-04-11 RX ADMIN — FAMOTIDINE 20 MG: 10 TABLET, FILM COATED ORAL at 09:01

## 2020-04-11 RX ADMIN — SENNOSIDES AND DOCUSATE SODIUM 1 TABLET: 8.6; 5 TABLET ORAL at 09:01

## 2020-04-11 ASSESSMENT — ACTIVITIES OF DAILY LIVING (ADL)
ADLS_ACUITY_SCORE: 14

## 2020-04-11 NOTE — DISCHARGE SUMMARY
[facility]  Hospitalist Discharge Summary      Date of Admission:  4/5/2020  Date of Discharge:  4/11/2020  Discharging Provider: Jasmyne Condon MD  Discharge Diagnoses   Principal Problem:    Emphysematous cystitis  Active Problems:    MRSA    Generalized anxiety disorder    GERD (gastroesophageal reflux disease)    Obesity    Uncontrolled type 2 diabetes mellitus with hyperglycemia (H)    Hypothyroidism    Severe hypertension    History of laryngeal cancer    Flank pain-right    Calculus of gallbladder without cholecystitis without obstruction    Adenoma of left adrenal gland-possible per CT findings    Multiple drug allergies    Follow-ups Needed After Discharge   Follow-up Appointments     Follow-up and recommended labs and tests       Follow up with Dr. Sixto Ellison within 7 days of discharge - this may   be by virtual visit or telephone call.  Follow up with urology within 2-4 weeks - this may be by virtual visit or   telephone call.           Discharge Disposition   Discharged to home  Condition at discharge: Stable    Hospital Course     Patient is a 55-year-old gentleman with uncontrolled diabetes, uncontrolled hypertension, and numerous drug sensitivities who presented with emphysematous cystitis with leukocytosis, fever up to 101 in the home environment and clinical picture concerning for SIRS vs sepsis development.  He was been started on IV meropenem with clinical improvement seen with patient's procalcitonin and white blood cell count trend downward and patient been afebrile since time of admission.  His urine culture is growing less than 10,000 mixed urogenital tip.  Did discuss this patient with infectious disease on 4/7/2020 given the severity of his initial illness as well as lack of culture identification and they recommend ongoing meropenem until 4/9/2020 with consideration of transitioning to an oral broad coverage antibiotic such as a  flouroquinolone with ongoing close monitoring in the hospital setting for ongoing improvement following transition.  They also recommended consultation with urology to see if any additional treatment is necessary either during this hospitalization or as an outpatient going forward.  Discuss his clinical situation with Dr. Moncada, urologist on-call for [facility], who reports that as the patient is clinically improving there is no need for inpatient urological evaluation.  She agreed that ciprofloxacin would be an appropriate choice for antibiotic and would recommend higher dosing, such as is used with pyelonephritis, as well as at least 14 days of antibiotics to ensure clearance of the organism given the emphysematous cystitis present.  She does feel that outpatient follow-up with urology would be appropriate given the patient's young age and severe illness for further evaluation of possible BPH versus other etiology that would increase his likelihood of recurrent infection going forward.  Patient was continued on Merrem until 4/9/2020 as recommended with procalcitonin trending downward, patient afebrile and pain improving with good urinary output and he transitioned to ciprofloxacin 500 mg twice daily.  WBC improved, procalcitonin continues to decrease following transition.  Blood pressures improved with metoprolol and enalapril.  Blood sugars improved with glipizide and metformin.  Patient is discharged home in stable condition with primary care provider and urology follow-up    Principal Problem:    Emphysematous cystitis    Assessment: Identified on CT scan imaging and patient with right flank pain, dysuria, and abnormal urinalysis with positive nitrates and significant pyuria with patient on IV meropenem but urine culture showing no significant organism growth.  Patient has received 6 days of antibiotics during this hospital stay    Plan: Continue ciprofloxacin 500 mg twice daily at time of  discharge for total course completion of 14 days.  He will have outpatient follow-up with urology as recommended.    Active Problems:    Flank pain-right    Assessment: present at time of admission - ongoing but progressively improving with treatment as above. No evidence of pyelonephritis on CT scan imaging from time of admission    Plan: Continue with ciprofloxacin at time of discharge.  Patient will be discharged with a small amount of oxycodone to assist with his ongoing discomfort but he is aware that this prescription should last him the course of his acute illness and he should not need a refill.      Severe hypertension    Assessment: Systolic blood pressure was in the 200s systolic and 110s diastolic on initial presentation in patient with known hypertension not recently taking any medication for this.  Work-up is in place for possible hyperaldosteronism as the patient was noted to have hypophosphatemia in addition to having a thickening of the left adrenal gland consistent with a left adrenal adenoma.  Patient has been started on enalapril and oral metoprolol and blood pressures appear to have improved greatly and are within goal for the 24 hours prior to discharge    Plan: We will continue with enalapril 10 mg daily and metoprolol to 50 mg BID at time of discharge.  Continue to monitor for hyperaldosteronism work up completion.        Uncontrolled type 2 diabetes mellitus with hyperglycemia (H)    Assessment: uncontrolled with hemoglobin A1c 8.5 and blood sugars consistently in the 200-300 range during this hospital stay, not on any medication prior to this hospitalization.  He has been started on a diabetic diet as well as glipizide and metformin in addition to ongoing high correction sliding scale insulin with blood sugars trending downward into the  range the 24 hours prior to discharge    Plan: Continue with diabetic diet, glipizide 10 mg twice daily, metformin 500 mg twice daily at time of  discharge.  Patient has been given a prescription for glucometer and will continue to monitor his blood sugars closely and discuss these results with his primary care provider going forward.      Hyponatremia    Assessment:  Mild and present consistently during this hospitalization - patient is asymptomatic and sodium has been stable in the 126-133 range.  At first thought possibly secondary to hyperglycemia however has remained low as blood sugars have been corrected.  Possibly secondary to elevated TSH of 25.63, however free T4 is normal.  Triglycerides normal.  Patient does at times drink large quantities of fluids and has a difficult time when being instructed to slow down or try to restrict fluids which may contribute.  Urine osmolality was elevated at 542 and urine sodium was elevated at 152 which would indicate hyperthyroidism, possible adrenal insufficiency or SIADH.      Plan:  Would recommend ongoing monitoring after discharge. If hyponatremia continues to be persistent after discharge, could consider ACTH stimulation test to further evaluate for adrenal insufficiency vs SIADH.  Patient is overall not interested in fluid restriction at this time.        Adenoma of left adrenal gland-possible per CT findings    Assessment: incidental radiographic finding during this hospitalization    Plan: Continue with work-up for hypoaldosteronism as above with further evaluation on the outpatient basis as needed      Constipation    Assessment: Patient reports struggling with his at home but it is currently worsened secondary to his narcotic use for his pain.  Currently on MiraLAX daily and Senokot twice daily with as needed milk of magnesia if recurrent constipation occurs.    Plan: Continue with MiraLAX and Senokot daily as well as milk of magnesia as needed at time of discharge      Multiple drug allergies    Assessment: On review of patient's chart, he does not have true drug allergies but rather has side effect  intolerances, mostly GI related however has been tolerating the ciprofloxacin well    Plan: Discharge with ciprofloxacin as above      History of laryngeal cancer    Assessment: Previous history without known reoccurrence    Plan: No acute intervention is needed      Generalized anxiety disorder    Assessment: Chronic and stable with patient somewhat consistently taking his home regimen    Plan: Continue sertraline daily at time of discharge, emphasizing the importance of daily use      GERD (gastroesophageal reflux disease)    Assessment: Well controlled with Pepcid but patient reports he has not been taking it consistently    Plan: Continue home regimen but encouraged consistent use going forward      Hypothyroidism    Assessment: On Synthroid, however unclear if patient has been can taking this consistently - TSH is elevated but free T4 is normal    Plan: No adjustment in Synthroid as deemed necessary at this time, however may be beneficial to obtain another TSH reflex to free T4 in 6 weeks after consistent use of this medication to see if there is significant change in any dose adjustment is needed at that time      Calculus of gallbladder without cholecystitis without obstruction    Assessment: Incidental finding with patient asymptomatic    Plan: No acute intervention is needed       Obesity    Assessment: Chronic and stable    Plan: No acute intervention is needed    Consultations This Hospital Stay   PHYSICAL THERAPY ADULT IP CONSULT    Code Status   Full Code    Time Spent on this Encounter   I, Jasmyne Condon MD, personally saw the patient today and spent greater than 30 minutes discharging this patient.       Jasmyne Condon MD  [facility]  ______________________________________________________________________    Physical Exam   Vital Signs: Temp: 97.3  F (36.3  C) Temp src: Oral BP: 122/71 Pulse: 74 Heart Rate: 76 Resp: 18 SpO2: 98 % O2 Device: None  (Room air)    Weight: 249 lbs 9.6 oz  Constitutional: awake, alert, cooperative, no apparent distress, and appears stated age  Respiratory: No increased work of breathing, good air exchange, clear to auscultation bilaterally, no crackles or wheezing  Cardiovascular: Normal apical impulse, regular rate and rhythm, normal S1 and S2, no S3 or S4, and no murmur noted   GI: bowel sounds present, abdomen soft and nontender  Skin: no redness, warmth, or swelling and no rashes  Musculoskeletal: no lower extremity pitting edema present  Neurologic: Awake, alert, oriented to name, place and time.        Primary Care Physician   Sixto Ellison    Discharge Orders      UROLOGY ADULT REFERRAL      Reason for your hospital stay    1.  Emphysematous cystitis (a bladder infection that was so severe it caused air to build up in the wall of your bladder which caused you a lot of pain).  You are improving with the antibiotics given during this stay.  Please continue to take the Ciprofloxacin twice a day as prescribed until the tablets are finished to make sure this infection completely goes away.  2.  Severe high blood pressure - improved greatly with the metoprolol and enalapril.  Please follow up with your doctor to make sure they continue to be well controlled after you leave the hospital  3.  Uncontrolled diabetes - you have been started on two medications for this (metformin and glipizide).  Please continue to take these medications as prescribed, follow a diabetic diet, and check your blood sugars once a day before breakfast and review the results with your doctor going forward.  If you have feeling weak, shaky, lightheaded or other symptoms that my indicate a low blood sugar, check you blood sugar right away and eat something sugary if the number is under 70.     Follow-up and recommended labs and tests     Follow up with Dr. Sixot Ellison within 7 days of discharge - this may be by virtual visit or telephone call.  Follow  up with urology within 2-4 weeks - this may be by virtual visit or telephone call.     Activity    Your activity upon discharge: activity as tolerated     Diet    Follow this diet upon discharge: Moderate consistent carbohydrate (9925-3535 anirudh / 4-6 CHO units per meal)       Significant Results and Procedures   Results for orders placed or performed during the hospital encounter of 04/05/20   CT Abdomen Pelvis w/o Contrast    Narrative    CT ABDOMEN AND PELVIS WITHOUT CONTRAST  4/5/2020 8:26 PM    HISTORY:  Flank pain, recurrent stone disease suspected - right.    TECHNIQUE: Scans obtained from the diaphragm through the pelvis  without oral or IV contrast.   Radiation dose for this scan was reduced using automated exposure  control, adjustment of the mA and/or kV according to patient size, or  iterative reconstruction technique.    COMPARISON:  CT abdomen and pelvis dated 9/11/2014.    FINDINGS:   Partial fusion of the anterior left SI joint is noted. There are mild  degenerative changes of the right SI joint. Degenerative changes are  noted in the spine. No aggressive osseous lesions or acute osseous  fractures. Visualized portions of the lung bases and mediastinal  contents are grossly unremarkable.    Multiple dependently layering calculi are seen in the gallbladder  measuring up to approximately 0.7 cm in diameter each. The liver,  gallbladder, pancreas, spleen, and bilateral kidneys are otherwise  normal in appearance for a noncontrast CT. There is mild thickening of  the left adrenal gland which is stable since the prior CT from 2014  measuring up to 1.2 cm in thickness with an average density of 3  Hounsfield units. This is most consistent with adenomatous change. No  hydronephrosis, nephrolithiasis, hydroureter or ureteral calculus is  identified.    There is gas in the urinary bladder wall consistent with emphysematous  cystitis. No calculus is identified. Urinary bladder is mildly  distended.    There is  nonaneurysmal atherosclerosis. No adenopathy, free fluid or  free air is seen in the peritoneal cavity. There is a small  fat-containing left inguinal hernia.    Prostate gland contains dystrophic calcifications but is otherwise  unremarkable. The colon is of normal caliber without pericolonic  inflammatory change to suggest acute diverticulitis. Appendix extends  posteriorly and superiorly from the cecum and is grossly normal in  appearance. Small bowel is of normal caliber. Stomach is mostly  decompressed but otherwise unremarkable.      Impression    IMPRESSION:  1. Emphysematous cystitis. This has a high association with diabetes  mellitus.  2. Nonaneurysmal atherosclerosis.  3. Cholelithiasis.  4. Stable left adrenal adenomata.  5. Evaluation of the solid organs of the abdomen and pelvis is limited  due to lack of IV contrast.  6. Stable, tiny, fat-containing, left, inguinal hernia.    I discussed the findings of emphysematous cystitis with Dr. Condon  on 4/5/2020 at 8:39 PM.    MORENA APPIAH MD       Discharge Medications   Current Discharge Medication List      START taking these medications    Details   alcohol swab prep pads Use to swab area of injection/aggie as directed.  Qty: 100 each, Refills: 0    Associated Diagnoses: Uncontrolled type 2 diabetes mellitus with hyperglycemia (H)      !! blood glucose (NO BRAND SPECIFIED) test strip Use to test blood sugar 1 x daily or as directed. To accompany: Blood Glucose Monitor Brands: per insurance.  Qty: 100 strip, Refills: 6    Associated Diagnoses: Uncontrolled type 2 diabetes mellitus with hyperglycemia (H)      blood glucose calibration (NO BRAND SPECIFIED) solution To accompany: Blood Glucose Monitor Brands: per insurance.  Qty: 1 Bottle, Refills: 0    Associated Diagnoses: Uncontrolled type 2 diabetes mellitus with hyperglycemia (H)      blood glucose monitoring (NO BRAND SPECIFIED) meter device kit Use to test blood sugar 1 x daily and as needed for  symptoms of low blood sugar or as directed. Blood Glucose Monitor Brands: per insurance.  Qty: 1 kit, Refills: 0    Associated Diagnoses: Uncontrolled type 2 diabetes mellitus with hyperglycemia (H)      ciprofloxacin (CIPRO) 500 MG tablet Take 1 tablet (500 mg) by mouth every 12 hours for 8 days  Qty: 16 tablet, Refills: 0    Associated Diagnoses: Emphysematous cystitis      enalapril (VASOTEC) 10 MG tablet Take 1 tablet (10 mg) by mouth daily  Qty: 30 tablet, Refills: 0    Associated Diagnoses: Severe hypertension      famotidine (PEPCID) 20 MG tablet Take 1 tablet (20 mg) by mouth 2 times daily  Qty: 60 tablet, Refills: 0    Associated Diagnoses: Gastroesophageal reflux disease without esophagitis      glipiZIDE (GLUCOTROL) 10 MG tablet Take 1 tablet (10 mg) by mouth 2 times daily (before meals)  Qty: 60 tablet, Refills: 0    Associated Diagnoses: Uncontrolled type 2 diabetes mellitus with hyperglycemia (H)      metFORMIN (GLUCOPHAGE) 500 MG tablet Take 1 tablet (500 mg) by mouth 2 times daily (with meals)  Qty: 60 tablet, Refills: 0    Associated Diagnoses: Uncontrolled type 2 diabetes mellitus with hyperglycemia (H)      metoprolol tartrate (LOPRESSOR) 50 MG tablet Take 1 tablet (50 mg) by mouth 2 times daily  Qty: 60 tablet, Refills: 0    Associated Diagnoses: Severe hypertension      naproxen (NAPROSYN) 500 MG tablet Take 1 tablet (500 mg) by mouth 2 times daily as needed for moderate pain - take with food  Qty: 60 tablet, Refills: 0    Associated Diagnoses: Emphysematous cystitis      oxyCODONE (ROXICODONE) 5 MG tablet Take 1-2 tablets (5-10 mg) by mouth every 4 hours as needed for moderate to severe pain - for your bladder infection pain only  Qty: 35 tablet, Refills: 0    Associated Diagnoses: Emphysematous cystitis      polyethylene glycol (MIRALAX) 17 g packet Take 17 g by mouth daily  Qty: 30 packet, Refills: 0    Associated Diagnoses: Drug-induced constipation      senna-docusate (SENOKOT-S/PERICOLACE)  8.6-50 MG tablet Take 1 tablet by mouth 2 times daily While on oxycodone medication, then may use as needed for constipation  Qty: 60 tablet, Refills: 0    Associated Diagnoses: Drug-induced constipation      !! thin (NO BRAND SPECIFIED) lancets Use with lanceting device. To accompany: Blood Glucose Monitor Brands: per insurance.  Qty: 100 each, Refills: 0    Associated Diagnoses: Uncontrolled type 2 diabetes mellitus with hyperglycemia (H)       !! - Potential duplicate medications found. Please discuss with provider.      CONTINUE these medications which have CHANGED    Details   levothyroxine (SYNTHROID/LEVOTHROID) 75 MCG tablet Take 1 tablet (75 mcg) by mouth daily  Qty: 30 tablet, Refills: 0    Associated Diagnoses: Hypothyroidism, unspecified type      sertraline (ZOLOFT) 50 MG tablet Take 1 tablet (50 mg) by mouth daily  Qty: 30 tablet, Refills: 0    Associated Diagnoses: Generalized anxiety disorder; Mild major depression (H)         CONTINUE these medications which have NOT CHANGED    Details   !! glucose blood VI test strips (CORRINA CONTOUR NEXT TEST) strip by In Vitro route daily.  Qty: 1 Box, Refills: 12    Associated Diagnoses: Abnormal blood sugar      !! Lancets (MICROLET) MISC Use to test blood sugars twice daily as directed.  Qty: 100 each, Refills: prn    Associated Diagnoses: Abnormal blood sugar       !! - Potential duplicate medications found. Please discuss with provider.      STOP taking these medications       Enalapril Maleate 1 MG/ML SOLR Comments:   Reason for Stopping:         HYDROcodone-acetaminophen (NORCO) 5-325 MG per tablet Comments:   Reason for Stopping:         ibuprofen (ADVIL,MOTRIN) 100 MG/5ML suspension Comments:   Reason for Stopping:         mineral oil-hydrophilic petrolatum (AQUAPHOR) ointment Comments:   Reason for Stopping:         ondansetron (ZOFRAN ODT) 4 MG disintegrating tablet Comments:   Reason for Stopping:         ranitidine (ZANTAC) 150 MG tablet Comments:  "  Reason for Stopping:             Allergies   Allergies   Allergen Reactions     Augmentin GI Disturbance     Clindamycin Hcl Nausea and Vomiting     Codeine Nausea and Vomiting     Prilosec [Omeprazole] Other (See Comments)     \" shuts pancreas down\"     Sulfa Drugs      Becomes very agitated \"messes with my head\"     "

## 2020-04-11 NOTE — PLAN OF CARE
Problem: Adult Inpatient Plan of Care  Goal: Plan of Care Review  4/11/2020 0336 by Lowell Anderson RN  Outcome: Improving  4/10/2020 2048 by Minal Gonzales RN  Outcome: Improving  4/10/2020 1347 by Juanita Serrano RN  Outcome: Improving  Goal: Patient-Specific Goal (Individualization)  4/11/2020 0336 by Lowell Anderson RN  Outcome: Improving  4/10/2020 2048 by Minal Gonzales RN  Outcome: Improving  4/10/2020 1347 by Juanita Serrano RN  Outcome: Improving  Goal: Absence of Hospital-Acquired Illness or Injury  4/11/2020 0336 by Lowell Anderson RN  Outcome: Improving  4/10/2020 2048 by Minal Gonzales RN  Outcome: Improving  4/10/2020 1347 by Juanita Serrano RN  Outcome: Improving  Goal: Optimal Comfort and Wellbeing  4/11/2020 0336 by Lowell Anderson RN  Outcome: Improving  4/10/2020 2048 by Minal Gonzales RN  Outcome: Improving  4/10/2020 1347 by Juanita Serrano RN  Outcome: Improving  Goal: Readiness for Transition of Care  4/11/2020 0336 by Lowell Anderson RN  Outcome: Improving  4/10/2020 2048 by Minal Gonzales RN  Outcome: Improving  4/10/2020 1347 by Juanita Serrano RN  Outcome: Improving  Goal: Rounds/Family Conference  4/10/2020 2048 by Minal Gonzales RN  Outcome: Improving  4/10/2020 1347 by Juanita Serrano RN  Outcome: Improving     Problem: Infection  Goal: Infection Symptom Resolution  4/11/2020 0336 by Lowell nAderson RN  Outcome: Improving  4/10/2020 2048 by Minal Gonzales RN  Outcome: Improving  4/10/2020 1347 by Juanita Serrano RN  Outcome: Improving     Pt sleeping most of the night thus far with minimal complaints of pain.  Pt remains alert and oriented x4 is pleasant and cooperative.   Nexproxen given x1 with favorable results.   Lung sounds are clear and diminished.   Blood sugar levels stable this shift.   Pt uses call light as advised and remains free of falls.   Iv is patent and saline locked.   Pt continues to use urinal at bedside.   Vitals remain stable.   BP  "129/66 (BP Location: Right arm)   Pulse 73   Temp 97.6  F (36.4  C) (Oral)   Resp 18   Ht 1.905 m (6' 3\")   Wt 113.2 kg (249 lb 9.6 oz)   SpO2 97%   BMI 31.20 kg/m    Will continue to monitor and promote comfort as care continues.     "

## 2020-04-11 NOTE — DISCHARGE INSTRUCTIONS
The Urology office should be calling you to set up for your future appointment, if they do not call you by Tuesday afternoon, please call them at [phone].

## 2020-04-11 NOTE — PLAN OF CARE
Vital signs:  Temp: 98.4  F (36.9  C) Temp src: Oral BP: 138/66 Pulse: 76 Heart Rate: 76 Resp: 20 SpO2: 97 % O2 Device: None (Room air)   A&Ox4. Pt pain remains between a 6-7/10. PRN oxycodone given x2 with slight decrease in pain. Lungs are clear. Skin intact. Voiding adequately. Tolerating regular diet and oral intake. Plan to discharge tomorrow morning. Will continue to monitor.

## 2020-04-11 NOTE — PROGRESS NOTES
S: Patient discharged to home via w/c with wife    B: Emphysematous cystitis     A: Right flank pain controlled with oxycodone. VSS, afebrile. Lungs clear.     R: Discharge instructions reviewed with patient. Listed belongings gathered and returned to patient.          Discharge Nursing Criteria:     Care Plan and Patient education resolved: Yes    New Medications- pt has been educated about purpose and side effects: Yes    MISC  Prescriptions if needed, hard copies sent with patient  NA  Home and hospital aquired medications returned to patient: Yes  Medication Bin checked and emptied on discharge Yes  Patient reports post-discharge pain management plan is effective: Yes

## 2020-04-13 ENCOUNTER — TELEPHONE (OUTPATIENT)
Dept: FAMILY MEDICINE | Facility: CLINIC | Age: 55
End: 2020-04-13

## 2020-04-13 NOTE — TELEPHONE ENCOUNTER
Patient called to schedule an appointment for a hospital follow-up or appeared on a report showing that they were recently discharged from the hospital.    Patient was admitted to [facility]:    Discharged date: 4/11/20  Reason for hospital admission:  Emphysematous Cystitis, Drug-Induced Constipation  Does patient have future appointment scheduled with provider? Yes Dr HUDDLESTON  Date of future appointment:  4/13/20      This information will be used to help the care team plan for the patients upcoming visit.  The triage RN may determine that a follow up call is necessary and reach out to the patient via the phone number listed in the chart.     Please route this message on routine priority to the Triage RN pool.

## 2020-04-15 DIAGNOSIS — N30.80 EMPHYSEMATOUS CYSTITIS: ICD-10-CM

## 2020-04-16 RX ORDER — OXYCODONE HYDROCHLORIDE 5 MG/1
TABLET ORAL
Qty: 35 TABLET | Refills: 0 | Status: SHIPPED | OUTPATIENT
Start: 2020-04-16 | End: 2020-04-22

## 2020-04-16 NOTE — TELEPHONE ENCOUNTER
Requested Prescriptions   Pending Prescriptions Disp Refills     oxyCODONE (ROXICODONE) 5 MG tablet [Pharmacy Med Name: OXYCODONE HCL 5MG TABS] 35 tablet 0     Sig: TAKE ONE TO TWO TABLETS BY MOUTH EVERY 4 HOURS AS NEEDED FOR MODERATE TO SEVERE PAIN (FOR YOUR BLADDER INFECTION PAIN ONLY)     Last Written Prescription Date:  04/10/2020  Last Fill Quantity: 35,   # refills: 0  Last Office Visit: 04/13/2020  Future Office visit:       Routing refill request to provider for review/approval because:  Drug not on the [facility], [facility] or [facility] refill protocol or controlled substance.     Lauryn Delgado MA

## 2020-04-21 DIAGNOSIS — N30.80 EMPHYSEMATOUS CYSTITIS: ICD-10-CM

## 2020-04-21 NOTE — TELEPHONE ENCOUNTER
Ciprofloxaxin 500 MG       Last Written Prescription Date:  4/11/2020  Last Fill Quantity: 16,   # refills: 0  Last Office Visit: 9-11-19  Future Office visit:       Routing refill request to provider for review/approval because:  Drug not on the [facility], [facility] or [facility] refill protocol or controlled substance

## 2020-04-21 NOTE — TELEPHONE ENCOUNTER
Roxicodone 5 MG       Last Written Prescription Date:  4/16/2020  Last Fill Quantity: 35,   # refills: 0  Last Office Visit: 9-11-19  Future Office visit:       Routing refill request to provider for review/approval because:  Drug not on the [facility], [facility] or [facility] refill protocol or controlled substance

## 2020-04-21 NOTE — TELEPHONE ENCOUNTER
Ciprofloxacin 500 MG       Last Written Prescription Date:  4/11/2020  Last Fill Quantity: 16,   # refills: 0  Last Office Visit: 9-11-19  Future Office visit:       Routing refill request to provider for review/approval because:  Drug not on the [facility], [facility] or [facility] refill protocol or controlled substance

## 2020-04-22 RX ORDER — CIPROFLOXACIN 500 MG/1
500 TABLET, FILM COATED ORAL EVERY 12 HOURS
Qty: 16 TABLET | Refills: 0 | OUTPATIENT
Start: 2020-04-22

## 2020-04-22 RX ORDER — OXYCODONE HYDROCHLORIDE 5 MG/1
TABLET ORAL
Qty: 35 TABLET | Refills: 0 | Status: SHIPPED | OUTPATIENT
Start: 2020-04-22 | End: 2020-04-29

## 2020-04-22 RX ORDER — CIPROFLOXACIN 500 MG/1
TABLET, FILM COATED ORAL
Qty: 16 TABLET | Refills: 0 | Status: SHIPPED | OUTPATIENT
Start: 2020-04-22

## 2020-04-25 NOTE — PROGRESS NOTES
"Kamaljit Montes De Oca  Gender: male  : 1965  [address] [facility] [place] [id number] [phone] (home)     Medical Record: [id number]  Pharmacy:    [facility] - [place] - [address] [id number] - [facility] [place], [place] - [address] AT [facility] [address]  [facility] [address]   Primary Care Provider: Sixto Ellison    Parent's names are: Data Unavailable (mother) and Data Unavailable (father).      [facility]  2020     Discharge Phone Call:      Initial callback attempt 20 4:11 PM Sara Bennett RN Unable to leave message due to listed number having \"unavailable\" message.    Second callback attempt 20 3:45 PM Elva Griggs RN Unable to leave message due to listed number having \"unavailable\" message.       "

## 2020-04-28 ENCOUNTER — VIRTUAL VISIT (OUTPATIENT)
Dept: FAMILY MEDICINE | Facility: CLINIC | Age: 55
End: 2020-04-28
Payer: COMMERCIAL

## 2020-04-28 DIAGNOSIS — N30.80 EMPHYSEMATOUS CYSTITIS: Primary | ICD-10-CM

## 2020-04-28 DIAGNOSIS — N30.80 EMPHYSEMATOUS CYSTITIS: ICD-10-CM

## 2020-04-28 DIAGNOSIS — R10.9 FLANK PAIN: ICD-10-CM

## 2020-04-28 PROCEDURE — 99214 OFFICE O/P EST MOD 30 MIN: CPT | Mod: 95 | Performed by: FAMILY MEDICINE

## 2020-04-28 RX ORDER — HYDROCODONE BITARTRATE AND ACETAMINOPHEN 5; 325 MG/1; MG/1
TABLET ORAL
Qty: 30 TABLET | Refills: 0 | Status: SHIPPED | OUTPATIENT
Start: 2020-04-28
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
## 2020-04-29 RX ORDER — OXYCODONE HYDROCHLORIDE 5 MG/1
TABLET ORAL
Qty: 35 TABLET | Refills: 0 | Status: SHIPPED | OUTPATIENT
Start: 2020-04-29

## 2020-04-29 NOTE — TELEPHONE ENCOUNTER
Roxicodone 5 MG       Last Written Prescription Date:  4/22/2020  Last Fill Quantity: 35,   # refills: 0  Last Office Visit: 4/28/2020  Future Office visit:    Next 5 appointments (look out 90 days)    Apr 29, 2020 10:40 AM CDT  Office Visit with Bishnu De La Torre PA-C  [facility] [address] [place] [id number]  [phone]           Routing refill request to provider for review/approval because:  Drug not on the [facility], [facility] or [facility] refill protocol or controlled substance

## 2020-07-08 DIAGNOSIS — E03.9 HYPOTHYROIDISM, UNSPECIFIED TYPE: ICD-10-CM

## 2020-07-08 DIAGNOSIS — F32.0 MILD MAJOR DEPRESSION (H): ICD-10-CM

## 2020-07-08 DIAGNOSIS — I10 SEVERE HYPERTENSION: ICD-10-CM

## 2020-07-08 DIAGNOSIS — E11.65 UNCONTROLLED TYPE 2 DIABETES MELLITUS WITH HYPERGLYCEMIA (H): ICD-10-CM

## 2020-07-08 DIAGNOSIS — F41.1 GENERALIZED ANXIETY DISORDER: ICD-10-CM

## 2020-07-08 RX ORDER — LANCETS
EACH MISCELLANEOUS
Qty: 100 EACH | Refills: 0 | Status: SHIPPED | OUTPATIENT
Start: 2020-07-08

## 2020-07-08 RX ORDER — METOPROLOL TARTRATE 50 MG
TABLET ORAL
Qty: 60 TABLET | Refills: 0 | Status: SHIPPED | OUTPATIENT
Start: 2020-07-08

## 2020-07-08 RX ORDER — GLIPIZIDE 10 MG/1
TABLET ORAL
Qty: 60 TABLET | Refills: 0 | Status: SHIPPED | OUTPATIENT
Start: 2020-07-08

## 2020-07-08 RX ORDER — LEVOTHYROXINE SODIUM 75 UG/1
TABLET ORAL
Qty: 30 TABLET | Refills: 0 | Status: SHIPPED | OUTPATIENT
Start: 2020-07-08 | End: 2021-03-24

## 2020-07-08 RX ORDER — ALCOHOL ANTISEPTIC PADS
PADS, MEDICATED (EA) TOPICAL
Qty: 100 EACH | Refills: 0 | Status: SHIPPED | OUTPATIENT
Start: 2020-07-08

## 2020-07-08 NOTE — TELEPHONE ENCOUNTER
Tried to reach out to both phone numbers on his account and neither one worked. Will route back to team so a letter can be sent.  Thank you,  Do Clark   for [facility]

## 2020-07-08 NOTE — LETTER
[address]   [address]  Tel. [phone] / Fax [phone]    July 8, 2020        Kamaljit Montes De Oca  [address] [address] [place] [id number]          Dear Kamaljit,    We have tried to reach you by phone but no return calls. Please contact our clinic to set up an appointment to have medications refilled.     Sincerely,        Sixto Ellison MD

## 2020-07-08 NOTE — TELEPHONE ENCOUNTER
Routing to schedulers to set up virtual appointment for patient for diabetes check.  Please also ask patient how much medication she has left and schedule appropriately.   If patient needs a refill before their appointment, please route to RN for completion of refill.  Upon routing message, write WHEN appointment is scheduled and if they have enough medication to last to appointment.  Thank you!

## 2020-11-17 NOTE — PROGRESS NOTES
[id number] Test for diabetes comes back positive for this.  Need to set up an appointment to discuss this and get going on medication and further workup of the diabetes.

## 2021-03-22 DIAGNOSIS — E03.9 HYPOTHYROIDISM, UNSPECIFIED TYPE: ICD-10-CM

## 2021-03-24 RX ORDER — LEVOTHYROXINE SODIUM 75 UG/1
TABLET ORAL
Qty: 90 TABLET | Refills: 0 | Status: SHIPPED | OUTPATIENT
Start: 2021-03-24

## 2021-03-24 NOTE — TELEPHONE ENCOUNTER
Routing refill request to provider for review/approval because:  Labs out of range:  [facility]    LAYLA LaraN, RN  [facility]